# Patient Record
(demographics unavailable — no encounter records)

---

## 2024-10-07 NOTE — REVIEW OF SYSTEMS
[Fatigue] : fatigue [Cough] : cough [Constipation] : constipation [Diarrhea: Grade 0] : Diarrhea: Grade 0 [Negative] : Allergic/Immunologic

## 2024-10-07 NOTE — HISTORY OF PRESENT ILLNESS
[Disease: _____________________] : Disease: [unfilled] [M: ___] : M[unfilled] [AJCC Stage: ____] : AJCC Stage: [unfilled] [de-identified] : 83 years old female with PMHX of Hypertension comes for evaluation of newly diagnosed Colon Cancer. As per patient, she was unable to move her bowels for 7 days. She admits to increased fatigue, weakness, abdominal pain, abdominal distention, nausea, vomiting and a weight loss of ~15 pounds. She was taken to [facility], and a CT-Scan of abdomen/pelvis was done that revealed a large bowel obstruction. A sigmoidoscopy was performed which found an obstructing sigmoid tumor, and a stent was placed.  A biopsy was done that was positive for Adenocarcinoma. She denies night sweats, fever, melena or bloody stools.  2024, CT-Scan of Abdomen/Pelvis Large bowel obstruction with secondary small bowel dilatation due to apparent sigmoid carcinoma. Pulmonary masses consistent with metastases. Suggest full chest CT Indeterminant left adrenal nodule. Indeterminant right renal hypodensity.   2024, Flexible Sigmoidoscopy: Malignant completely obstructing tumor in the sigmoid colon s/p biopsy and stent placement (25 x 90 mm). Diverticulosis in the sigmoid colon. Pathology report from that was as follows: Colon, sigmoid mass, biopsy- Adenocarcinoma, superficial fragment. Immunohistochemistry Testing (IHC) for Mismatch Repair Protein: MLH1: Intact nuclear expression. MSH2: Intact nuclear expression. MSH6: Intact nuclear expression. PMS2: Intact nuclear expression. Interpretation: No loss of nuclear expression of MMR proteins (MHL1, MSH2, MSH6, and PMS2). These results show low probability of microsatellite instability-high (MSI-H). PD-L1: Tumor Proportion Score (TPS)=0%.   2024, Fine Needle Aspiration of Lun. LUNG, MAINSTEM, RIGHT, ENDOBRONCHIAL BIOPSY: POSITIVE FOR MALIGNANT CELLS. Adenocarcinoma, CDX2, Ck20 pos, CK7-focal TTF-1 neg consistent with colon primary 2. LUNG, LEFT LOWER LOBE, BRONCHOALVEOLAR LAVAGE: SUSPICIOUS FOR MALIGNANCY. Suspicious for adenocarcinoma  Patient is . She was living alone in [facility] but now, she lives with one of daughter in [place], [place]. She has 3 well adult children. She never smoked. She drinks alcohol socially. The patient is a retired [employer] Worker. The patient has a positive family history of cancer. Her father had Pancreatic Cancer in his 80's. Her paternal grandmother had Breast Cancer in her 90's. Her mother had a unknown type of cancer in her 50's.    [de-identified] : left sided  colon ca, Adenocarcinoma MMR-P, PDL-1- TPS-0 [de-identified] : Lung metastases biopsy pos for metastases, CK20 and CDX-2 pos, TTF-1 neg CK-7 focal consistent with colon primary. [de-identified] : Since the last visit the pt remains stable but has decreased appetite and losing weight.  She states she has difficulty swallowing.  Patient's mouth sores are decreased however.

## 2024-10-07 NOTE — ASSESSMENT
[FreeTextEntry1] : The patient continues on chemotherapy with FOLFOX and Vectibix and tolerates treatment well but has decreased appetite and decreased caloric intake.  Patient will undergo palliative care consultation and evaluation for medical marijuana to stimulate appetite.  The patient will also undergo repeat MRI of the abdomen.  Patient has mild sores but decreasing in severity.  Patient has dry cough and x-ray has been ordered.  He will return to the office in 2 weeks or sooner as needed. [Palliative] : Goals of care discussed with patient: Palliative [Palliative Care Plan] : not applicable at this time

## 2024-10-14 NOTE — PHYSICAL EXAM
[General Appearance - Alert] : alert [General Appearance - In No Acute Distress] : in no acute distress [General Appearance - Well Nourished] : well nourished [General Appearance - Well Developed] : well developed [Extraocular Movements] : extraocular movements were intact [Hearing Threshold Finger Rub Not [place]] : hearing was normal [] : no respiratory distress [Respiration, Rhythm And Depth] : normal respiratory rhythm and effort [Exaggerated Use Of Accessory Muscles For Inspiration] : no accessory muscle use [Auscultation Breath Sounds / Voice Sounds] : lungs were clear to auscultation bilaterally [Heart Rate And Rhythm] : heart rate was normal and rhythm regular [Heart Sounds] : normal S1 and S2 [Murmurs] : no murmurs [Bowel Sounds] : normal bowel sounds [Abdomen Soft] : soft [Abdomen Tenderness] : non-tender [Involuntary Movements] : no involuntary movements were seen [No Focal Deficits] : no focal deficits [Oriented To Time, Place, And Person] : oriented to person, place, and time [Impaired Insight] : insight and judgment were intact [Affect] : the affect was normal [Mood] : the mood was normal

## 2024-10-14 NOTE — DATA REVIEWED
[FreeTextEntry1] : CT C/A/P  (08/20/2024):   COMPARISON: Chest CT from 5/16/2024. CT scan of abdomen and pelvis from 5/12/2024. CT scans of chest, abdomen and pelvis from 3/11/2024.  FINDINGS: CHEST: LUNGS AND LARGE AIRWAYS: Decrease in size of lung nodules and masses, consistent with improvement in pulmonary metastatic disease. For instance, mass in anterior segment of right upper lobe has decreased from 69 x 47 mm to 35 x 30 mm (4/97). Nodule in right apex has decreased from 19 x 14 mm to 14 x 8 mm (4/65). PLEURA: No pleural effusion. VESSELS: Within normal limits. HEART: Cardiomegaly. No pericardial effusion. MEDIASTINUM AND NHUNG: Improvement in thoracic lymphadenopathy. For instance, station 7 lymph node has decreased from 2.8 cm to 1.5 cm, Station 11RS lymph node has decreased from 2.7 cm to 2.1 cm, and station 5 lymph node has decreased from 2.1 cm to 0.9 cm. The lymph nodes are partially calcified. CHEST WALL AND LOWER NECK: Right chest wall [id number] with tip in the right atrium. Small breast calcifications again present bilaterally.  ABDOMEN AND PELVIS: LIVER: Possible new subtle 0.8 cm low-density lesion segment 8 (2/58). BILE DUCTS: Normal caliber. GALLBLADDER: Within normal limits. SPLEEN: Within normal limits. PANCREAS: Within normal limits. ADRENALS: Within normal limits. KIDNEYS/URETERS: 1.3 cm cyst right kidney. Left kidney within normal limits.  BLADDER: Within normal limits. REPRODUCTIVE ORGANS: Calcified uterine fibroids.  BOWEL: Small hiatal hernia. No bowel obstruction. A stent again traverses the mass of the distal descending colon. The mass has decreased in size from 7.9 x 4.9 cm to 5.8 x 3.9 cm (2/107). The mass is again partially calcified and extends medially into the mesentery. Multiple diverticula in the sigmoid colon and cecum. Normal appendix visualized. PERITONEUM/RETROPERITONEUM: Within normal limits. VESSELS: Atherosclerotic changes. LYMPH NODES: No lymphadenopathy. ABDOMINAL WALL: Within normal limits. BONES: Degenerative changes.  IMPRESSION: 1. Since 5/16/2024, there has been improvement in pulmonary metastatic disease.  2. Improvement in thoracic lymphadenopathy.  3. Decrease in size of colon mass.  4. There is a possible new small liver mass. Recommend MRI of the abdomen with IV contrast material for further evaluation.

## 2024-10-14 NOTE — ASSESSMENT
[FreeTextEntry1] : 84yoF with:  # Colon cancer  - On chemo with FOLFOX and Vectibix, tolerating treatment  # Decrease in appetite  - Plan to retry magic mouthwash - Medical cannabis certification completed today. Provided cannabis education, overview of state program, and discussed adverse effects in detail. Counseled that vaporized cannabis is not the preferred route of administration due to the fact that both short-term and long-term risks associated with vaporizing oils are not yet fully understood. Recommend starting with 1:1 THC:CBD formulation at low dose of THC (~2mg/dose).   # Constipation - Start Senna at bedtime  # Advanced care planning - Primary HCP is daughter - Марина Ricks [phone], Alternate is son Rob Ricks [phone] (lives in [place]). Plans to bring copy of HCP paperwork next visit  # Encounter for palliative care  - Emotional support provided   Follow up in 2 weeks, call sooner with questions or issues.

## 2024-10-14 NOTE — HISTORY OF PRESENT ILLNESS
[FreeTextEntry1] : 84yoF with colon cancer presents for initial palliative care visit, referred by oncology. PMH significant for HTN.  Onc Hx: Came in for evaluation of newly diagnosed Colon Cancer. As per patient, she was unable to move her bowels for 7 days. She admits to increased fatigue, weakness, abdominal pain, abdominal distention, nausea, vomiting and a weight loss of ~15 pounds. She was taken to [facility], and a CT-Scan of abdomen/pelvis was done that revealed a large bowel obstruction. A sigmoidoscopy was performed which found an obstructing sigmoid tumor, and a stent was placed. A biopsy was done that was positive for Adenocarcinoma. She denies night sweats, fever, melena or bloody stools.  2024, CT-Scan of Abdomen/Pelvis Large bowel obstruction with secondary small bowel dilatation due to apparent sigmoid carcinoma. Pulmonary masses consistent with metastases. Suggest full chest CT Indeterminant left adrenal nodule. Indeterminant right renal hypodensity.  2024, Flexible Sigmoidoscopy: Malignant completely obstructing tumor in the sigmoid colon s/p biopsy and stent placement (25 x 90 mm). Diverticulosis in the sigmoid colon. Pathology report from that was as follows: Colon, sigmoid mass, biopsy- Adenocarcinoma, superficial fragment. Immunohistochemistry Testing (IHC) for Mismatch Repair Protein: MLH1: Intact nuclear expression. MSH2: Intact nuclear expression. MSH6: Intact nuclear expression. PMS2: Intact nuclear expression. Interpretation: No loss of nuclear expression of MMR proteins (MHL1, MSH2, MSH6, and PMS2). These results show low probability of microsatellite instability-high (MSI-H). PD-L1: Tumor Proportion Score (TPS)=0%.  2024, Fine Needle Aspiration of Lun. LUNG, MAINSTEM, RIGHT, ENDOBRONCHIAL BIOPSY: POSITIVE FOR MALIGNANT CELLS. Adenocarcinoma, CDX2, Ck20 pos, CK7-focal TTF-1 neg consistent with colon primary 2. LUNG, LEFT LOWER LOBE, BRONCHOALVEOLAR LAVAGE: SUSPICIOUS FOR MALIGNANCY. Suspicious for adenocarcinoma   Disease: Colon Cancer   Pathology: left sided colon ca, Adenocarcinoma MMR-P, PDL-1- TPS-0   TNM stage: MI AJCC Stage: IV   Lung metastases biopsy pos for metastases, CK20 and CDX-2 pos, TTF-1 neg CK-7 focal consistent with colon primary.   Interval History: Since the last visit the pt remains stable but has decreased appetite and losing weight. She states she has difficulty swallowing. Patient's mouth sores are decreased however. ------------------------------------------------------------------------------------------------------ Pt was referred to supportive oncology for symptom management. She is accompanied by her daughter.    Pain: Denies Constipation/Diarrhea: Taking Colace and miralax daily. Having BM every 2 days. Was using senna tea bags but stopped due to discomfort.  Nausea/Vomiting: Nausea with eating. Taking Zofran 8mg q8h  Appetite/Weight changes: Losing weight. Not interested in food. Daughter is scheduling follow up with dentist to correct dentures. Admits to some sores in mouth so prefers eating soft foods. Sleep: No difficulty sleeping   Social: Patient is . She was living alone in [facility], but now, she lives with one of daughter in [place], [place]. She has 3 well adult children. She never smoked. She drinks alcohol socially. The patient is a retired [employer] Worker. The patient has a positive family history of cancer. Her father had Pancreatic Cancer in his 80's. Her paternal grandmother had Breast Cancer in her 90's. Her mother had a unknown type of cancer in her 50's.   Advance Directives / Decision Makers: Primary HCP is daughter - Марина Ricks [phone], Alternate is son Rob Ricks [phone] (lives in [place]). Plans to bring copy of HCP paperwork next visit.   ISTOP #: [id number]

## 2024-10-21 NOTE — ASSESSMENT
[FreeTextEntry1] : 84yoF with:  # Colon cancer  - On chemo with FOLFOX and Vectibix, tolerating treatment  # Decrease in appetite  - Relief with magic mouthwash, encouraged her to use more frequently with meals - Recommended pt to dec cannabis dose from 5mg to 2.5mg, pt agreeable.  # Constipation - Improved - Continue Senna at bedtime  # Advanced care planning - Primary HCP is daughter - Марина Ricks [phone], Alternate is son Rob Ricks [phone] (lives in [place])  # Encounter for palliative care  - Emotional support provided   Follow up in 2 weeks, call sooner with questions or issues.

## 2024-10-21 NOTE — PHYSICAL EXAM
[Thin] : thin [Restricted in physically strenuous activity but ambulatory and able to carry out work of a light or sedentary nature] : Status 1- Restricted in physically strenuous activity but ambulatory and able to carry out work of a light or sedentary nature, e.g., light house work, office work [Normal] : well developed, well nourished, in no acute distress

## 2024-10-21 NOTE — PHYSICAL EXAM
[General Appearance - Alert] : alert [General Appearance - In No Acute Distress] : in no acute distress [General Appearance - Well Nourished] : well nourished [General Appearance - Well Developed] : well developed [Extraocular Movements] : extraocular movements were intact [Hearing Threshold Finger Rub Not [place]] : hearing was normal [] : no respiratory distress [Respiration, Rhythm And Depth] : normal respiratory rhythm and effort [Exaggerated Use Of Accessory Muscles For Inspiration] : no accessory muscle use [Auscultation Breath Sounds / Voice Sounds] : lungs were clear to auscultation bilaterally [Heart Rate And Rhythm] : heart rate was normal and rhythm regular [Heart Sounds] : normal S1 and S2 [Murmurs] : no murmurs [Bowel Sounds] : normal bowel sounds [Abdomen Soft] : soft [Abdomen Tenderness] : non-tender [Involuntary Movements] : no involuntary movements were seen [No Focal Deficits] : no focal deficits [Oriented To Time, Place, And Person] : oriented to person, place, and time [Impaired Insight] : insight and judgment were intact [Affect] : the affect was normal [Mood] : the mood was normal

## 2024-10-21 NOTE — DATA REVIEWED
[FreeTextEntry1] : MR ABDOMEN WAW IC  (10/17/2024):   COMPARISON: CT abdomen pelvis 8/20/2024  FINDINGS: LOWER CHEST: Nodular masslike opacities in the right middle and lower lobe better characterized on recent prior CT, compatible with metastatic disease.  LIVER: Normal size and morphology. No steatosis. No suspicious focal liver lesion. Abnormality in the right hepatic lobe seen on CT 8/20/2024 BILE DUCTS: Normal caliber. GALLBLADDER: Cholelithiasis. SPLEEN: Within normal limits. PANCREAS: Within normal limits. ADRENALS: Within normal limits. KIDNEYS/URETERS: No hydronephrosis. There is a 1.3 cm cyst in the right kidney exhibiting mild T1 hyperintensity and no enhancement, likely representing proteinaceous or hemorrhagic component.  VISUALIZED PORTIONS: BOWEL: Partially visualized distal descending colon stent and irregular colon mass. No bowel obstruction. PERITONEUM: No ascites. VESSELS: Within normal limits. RETROPERITONEUM/LYMPH NODES: No lymphadenopathy. ABDOMINAL WALL: Within normal limits. BONES: Within normal limits.  IMPRESSION: No suspicious liver lesions. ----- CT C/A/P  (08/20/2024):   COMPARISON: Chest CT from 5/16/2024. CT scan of abdomen and pelvis from 5/12/2024. CT scans of chest, abdomen and pelvis from 3/11/2024.  FINDINGS: CHEST: LUNGS AND LARGE AIRWAYS: Decrease in size of lung nodules and masses, consistent with improvement in pulmonary metastatic disease. For instance, mass in anterior segment of right upper lobe has decreased from 69 x 47 mm to 35 x 30 mm (4/97). Nodule in right apex has decreased from 19 x 14 mm to 14 x 8 mm (4/65). PLEURA: No pleural effusion. VESSELS: Within normal limits. HEART: Cardiomegaly. No pericardial effusion. MEDIASTINUM AND NHUNG: Improvement in thoracic lymphadenopathy. For instance, station 7 lymph node has decreased from 2.8 cm to 1.5 cm, Station 11RS lymph node has decreased from 2.7 cm to 2.1 cm, and station 5 lymph node has decreased from 2.1 cm to 0.9 cm. The lymph nodes are partially calcified. CHEST WALL AND LOWER NECK: Right chest wall [id number]-Hsik with tip in the right atrium. Small breast calcifications again present bilaterally.  ABDOMEN AND PELVIS: LIVER: Possible new subtle 0.8 cm low-density lesion segment 8 (2/58). BILE DUCTS: Normal caliber. GALLBLADDER: Within normal limits. SPLEEN: Within normal limits. PANCREAS: Within normal limits. ADRENALS: Within normal limits. KIDNEYS/URETERS: 1.3 cm cyst right kidney. Left kidney within normal limits.  BLADDER: Within normal limits. REPRODUCTIVE ORGANS: Calcified uterine fibroids.  BOWEL: Small hiatal hernia. No bowel obstruction. A stent again traverses the mass of the distal descending colon. The mass has decreased in size from 7.9 x 4.9 cm to 5.8 x 3.9 cm (2/107). The mass is again partially calcified and extends medially into the mesentery. Multiple diverticula in the sigmoid colon and cecum. Normal appendix visualized. PERITONEUM/RETROPERITONEUM: Within normal limits. VESSELS: Atherosclerotic changes. LYMPH NODES: No lymphadenopathy. ABDOMINAL WALL: Within normal limits. BONES: Degenerative changes.  IMPRESSION: 1. Since 5/16/2024, there has been improvement in pulmonary metastatic disease.  2. Improvement in thoracic lymphadenopathy.  3. Decrease in size of colon mass.  4. There is a possible new small liver mass. Recommend MRI of the abdomen with IV contrast material for further evaluation.

## 2024-10-21 NOTE — HISTORY OF PRESENT ILLNESS
[FreeTextEntry1] : 84yoF with colon cancer presents for follow up palliative care visit, referred by oncology. PMH significant for HTN.  Onc Hx: Came in for evaluation of newly diagnosed Colon Cancer. As per patient, she was unable to move her bowels for 7 days. She admits to increased fatigue, weakness, abdominal pain, abdominal distention, nausea, vomiting and a weight loss of ~15 pounds. She was taken to [facility], and a CT-Scan of abdomen/pelvis was done that revealed a large bowel obstruction. A sigmoidoscopy was performed which found an obstructing sigmoid tumor, and a stent was placed. A biopsy was done that was positive for Adenocarcinoma. She denies night sweats, fever, melena or bloody stools.  2024, CT-Scan of Abdomen/Pelvis Large bowel obstruction with secondary small bowel dilatation due to apparent sigmoid carcinoma. Pulmonary masses consistent with metastases. Suggest full chest CT Indeterminant left adrenal nodule. Indeterminant right renal hypodensity.  2024, Flexible Sigmoidoscopy: Malignant completely obstructing tumor in the sigmoid colon s/p biopsy and stent placement (25 x 90 mm). Diverticulosis in the sigmoid colon. Pathology report from that was as follows: Colon, sigmoid mass, biopsy- Adenocarcinoma, superficial fragment. Immunohistochemistry Testing (IHC) for Mismatch Repair Protein: MLH1: Intact nuclear expression. MSH2: Intact nuclear expression. MSH6: Intact nuclear expression. PMS2: Intact nuclear expression. Interpretation: No loss of nuclear expression of MMR proteins (MHL1, MSH2, MSH6, and PMS2). These results show low probability of microsatellite instability-high (MSI-H). PD-L1: Tumor Proportion Score (TPS)=0%.  2024, Fine Needle Aspiration of Lun. LUNG, MAINSTEM, RIGHT, ENDOBRONCHIAL BIOPSY: POSITIVE FOR MALIGNANT CELLS. Adenocarcinoma, CDX2, Ck20 pos, CK7-focal TTF-1 neg consistent with colon primary 2. LUNG, LEFT LOWER LOBE, BRONCHOALVEOLAR LAVAGE: SUSPICIOUS FOR MALIGNANCY. Suspicious for adenocarcinoma   Disease: Colon Cancer   Pathology: left sided colon ca, Adenocarcinoma MMR-P, PDL-1- TPS-0   TNM stage: MI AJCC Stage: IV   Lung metastases biopsy pos for metastases, CK20 and CDX-2 pos, TTF-1 neg CK-7 focal consistent with colon primary.   Interval History: Since the last visit the pt remains stable but has decreased appetite and losing weight. She states she has difficulty swallowing. Patient's mouth sores are decreased however. ------------------------------------------------------------------------------------------------------ Pt was referred to supportive oncology for symptom management. She is accompanied by her daughter. At initial evaluation, she denies pain. She is taking Colace and miralax daily. Having BM every 2 days but strains at times. Was using senna tea bags but stopped due to discomfort. Admits to nausea while eating, taking Zofran 8mg q8h. She also admits to weight loss. She is not interested in food. Daughter is scheduling follow up with dentist to correct dentures. Admits to some sores in mouth so prefers eating soft foods. Denies difficulty sleeping.   Social: Patient is . She was living alone in [facility], but now, she lives with one of daughter in [place], [place]. She has 3 well adult children. She never smoked. She drinks alcohol socially. The patient is a retired [employer] Worker. The patient has a positive family history of cancer. Her father had Pancreatic Cancer in his 80's. Her paternal grandmother had Breast Cancer in her 90's. Her mother had a unknown type of cancer in her 50's.   Advance Directives / Decision Makers: Primary HCP is daughter - Марина Ricks [phone], Alternate is son Rob Ricks [phone] (lives in [place]). Plans to bring copy of HCP paperwork next visit.   10/21/24 Interval History: Started cannabis on Friday, 5mg chews 1:1. She felt relaxed, didn't affect appetite much. Using magic mouthwash with relief. She remotes improvement in constipation with adding Senna to her regimen. She has no other concerns today.   ISTOP #: [id number]

## 2024-10-22 NOTE — HISTORY OF PRESENT ILLNESS
[Disease: _____________________] : Disease: [unfilled] [M: ___] : M[unfilled] [AJCC Stage: ____] : AJCC Stage: [unfilled] [de-identified] : 83 years old female with PMHX of Hypertension comes for evaluation of newly diagnosed Colon Cancer. As per patient, she was unable to move her bowels for 7 days. She admits to increased fatigue, weakness, abdominal pain, abdominal distention, nausea, vomiting and a weight loss of ~15 pounds. She was taken to [facility], and a CT-Scan of abdomen/pelvis was done that revealed a large bowel obstruction. A sigmoidoscopy was performed which found an obstructing sigmoid tumor, and a stent was placed.  A biopsy was done that was positive for Adenocarcinoma. She denies night sweats, fever, melena or bloody stools.  2024, CT-Scan of Abdomen/Pelvis Large bowel obstruction with secondary small bowel dilatation due to apparent sigmoid carcinoma. Pulmonary masses consistent with metastases. Suggest full chest CT Indeterminant left adrenal nodule. Indeterminant right renal hypodensity.   2024, Flexible Sigmoidoscopy: Malignant completely obstructing tumor in the sigmoid colon s/p biopsy and stent placement (25 x 90 mm). Diverticulosis in the sigmoid colon. Pathology report from that was as follows: Colon, sigmoid mass, biopsy- Adenocarcinoma, superficial fragment. Immunohistochemistry Testing (IHC) for Mismatch Repair Protein: MLH1: Intact nuclear expression. MSH2: Intact nuclear expression. MSH6: Intact nuclear expression. PMS2: Intact nuclear expression. Interpretation: No loss of nuclear expression of MMR proteins (MHL1, MSH2, MSH6, and PMS2). These results show low probability of microsatellite instability-high (MSI-H). PD-L1: Tumor Proportion Score (TPS)=0%.   2024, Fine Needle Aspiration of Lun. LUNG, MAINSTEM, RIGHT, ENDOBRONCHIAL BIOPSY: POSITIVE FOR MALIGNANT CELLS. Adenocarcinoma, CDX2, Ck20 pos, CK7-focal TTF-1 neg consistent with colon primary 2. LUNG, LEFT LOWER LOBE, BRONCHOALVEOLAR LAVAGE: SUSPICIOUS FOR MALIGNANCY. Suspicious for adenocarcinoma  Patient is . She was living alone in [facility] but now, she lives with one of daughter in [place], [place]. She has 3 well adult children. She never smoked. She drinks alcohol socially. The patient is a retired [employer] Worker. The patient has a positive family history of cancer. Her father had Pancreatic Cancer in his 80's. Her paternal grandmother had Breast Cancer in her 90's. Her mother had a unknown type of cancer in her 50's.    [de-identified] : left sided  colon ca, Adenocarcinoma MMR-P, PDL-1- TPS-0 [de-identified] : Lung metastases biopsy pos for metastases, CK20 and CDX-2 pos, TTF-1 neg CK-7 focal consistent with colon primary. [de-identified] : S/p C10 FOLFOX/Vectibex. Since the last visit, the patient has started taking Cannabis gummies. She has started to eat a little better. Her cough has become slightly better, but she has not done the CXR, yet. She has no fevers.

## 2024-10-22 NOTE — HISTORY OF PRESENT ILLNESS
[Disease: _____________________] : Disease: [unfilled] [M: ___] : M[unfilled] [AJCC Stage: ____] : AJCC Stage: [unfilled] [de-identified] : 83 years old female with PMHX of Hypertension comes for evaluation of newly diagnosed Colon Cancer. As per patient, she was unable to move her bowels for 7 days. She admits to increased fatigue, weakness, abdominal pain, abdominal distention, nausea, vomiting and a weight loss of ~15 pounds. She was taken to [facility], and a CT-Scan of abdomen/pelvis was done that revealed a large bowel obstruction. A sigmoidoscopy was performed which found an obstructing sigmoid tumor, and a stent was placed.  A biopsy was done that was positive for Adenocarcinoma. She denies night sweats, fever, melena or bloody stools.  2024, CT-Scan of Abdomen/Pelvis Large bowel obstruction with secondary small bowel dilatation due to apparent sigmoid carcinoma. Pulmonary masses consistent with metastases. Suggest full chest CT Indeterminant left adrenal nodule. Indeterminant right renal hypodensity.   2024, Flexible Sigmoidoscopy: Malignant completely obstructing tumor in the sigmoid colon s/p biopsy and stent placement (25 x 90 mm). Diverticulosis in the sigmoid colon. Pathology report from that was as follows: Colon, sigmoid mass, biopsy- Adenocarcinoma, superficial fragment. Immunohistochemistry Testing (IHC) for Mismatch Repair Protein: MLH1: Intact nuclear expression. MSH2: Intact nuclear expression. MSH6: Intact nuclear expression. PMS2: Intact nuclear expression. Interpretation: No loss of nuclear expression of MMR proteins (MHL1, MSH2, MSH6, and PMS2). These results show low probability of microsatellite instability-high (MSI-H). PD-L1: Tumor Proportion Score (TPS)=0%.   2024, Fine Needle Aspiration of Lun. LUNG, MAINSTEM, RIGHT, ENDOBRONCHIAL BIOPSY: POSITIVE FOR MALIGNANT CELLS. Adenocarcinoma, CDX2, Ck20 pos, CK7-focal TTF-1 neg consistent with colon primary 2. LUNG, LEFT LOWER LOBE, BRONCHOALVEOLAR LAVAGE: SUSPICIOUS FOR MALIGNANCY. Suspicious for adenocarcinoma  Patient is . She was living alone in [facility] but now, she lives with one of daughter in [place], [place]. She has 3 well adult children. She never smoked. She drinks alcohol socially. The patient is a retired [employer] Worker. The patient has a positive family history of cancer. Her father had Pancreatic Cancer in his 80's. Her paternal grandmother had Breast Cancer in her 90's. Her mother had a unknown type of cancer in her 50's.    [de-identified] : left sided  colon ca, Adenocarcinoma MMR-P, PDL-1- TPS-0 [de-identified] : Lung metastases biopsy pos for metastases, CK20 and CDX-2 pos, TTF-1 neg CK-7 focal consistent with colon primary. [de-identified] : S/p C10 FOLFOX/Vectibex. Since the last visit, the patient has started taking Cannabis gummies. She has started to eat a little better. Her cough has become slightly better, but she has not done the CXR, yet. She has no fevers.

## 2024-10-22 NOTE — REVIEW OF SYSTEMS
[Fatigue] : fatigue [Constipation] : constipation [Diarrhea: Grade 0] : Diarrhea: Grade 0 [Cough] : cough [Negative] : Neurological [Fever] : no fever [Chills] : no chills [Night Sweats] : no night sweats [Recent Change In Weight] : ~T no recent weight change [Abdominal Pain] : no abdominal pain [FreeTextEntry2] : +decreased appetite, [de-identified] : left hand tingling

## 2024-10-22 NOTE — ASSESSMENT
[Palliative] : Goals of care discussed with patient: Palliative [Palliative Care Plan] : not applicable at this time [FreeTextEntry1] : The patient continues on chemotherapy with FOLFOX and Vectibix and tolerates treatment fine. She has an improved appetite with Cannabis gummies. She is advised to continue as directed. The patient had repeat MRI of the abdomen on 10/17/2024 which showed; no new metastatic disease, but in the lower chest there was a nodular masslike opacities in the right middle and lower lobe better characterized on recent prior CT, compatible with metastatic disease. Dr. Aguilar will review the results of MRI to determine if any changes are needed to her present treatment.. She will return to the office in 2 weeks or sooner as needed.

## 2024-10-22 NOTE — REVIEW OF SYSTEMS
[Fatigue] : fatigue [Constipation] : constipation [Diarrhea: Grade 0] : Diarrhea: Grade 0 [Cough] : cough [Negative] : Neurological [Fever] : no fever [Chills] : no chills [Night Sweats] : no night sweats [Recent Change In Weight] : ~T no recent weight change [Abdominal Pain] : no abdominal pain [FreeTextEntry2] : +decreased appetite, [de-identified] : left hand tingling

## 2024-10-23 NOTE — PHYSICAL EXAM
[Normal] : no acute distress, well nourished, well developed and well-appearing [No Respiratory Distress] : no respiratory distress  [No Accessory Muscle Use] : no accessory muscle use [Clear to Auscultation] : lungs were clear to auscultation bilaterally [Normal Rate] : normal rate  [Regular Rhythm] : with a regular rhythm [Normal S1, S2] : normal S1 and S2 [Pedal Pulses Present] : the pedal pulses are present [No Edema] : there was no peripheral edema [Normal Affect] : the affect was normal [Normal Mood] : the mood was normal

## 2024-10-28 NOTE — HISTORY OF PRESENT ILLNESS
[Family Member] : family member [FreeTextEntry8] : Patient is an 85 y/o F with PMHx of HTN, prediabetes, HLD, newly diagnosed sigmoid colon (s/p sigmoidoscopy and stent placement) following Heme/onc, who was sent by Heme/onc to f/u on elevated Cr levels.   Patient currently on chemotherapy for colon cancer w/ Mets to lungs. patient on FOLFOX and Vectibix. Patient's daughter also reports that the patient has no appetite. She is also not drinking enough water. Patient denies urinary issues and still urinating normal.

## 2024-10-28 NOTE — ASSESSMENT
[FreeTextEntry1] : The plan of care was discussed with the patient and her daughter in full detail. They verbalized understanding and in agreement with the plan of care.  RTC in 1 month or early if needed.

## 2024-10-28 NOTE — HISTORY OF PRESENT ILLNESS
[Family Member] : family member [FreeTextEntry8] : Patient is an 83 y/o F with PMHx of HTN, prediabetes, HLD, newly diagnosed sigmoid colon (s/p sigmoidoscopy and stent placement) following Heme/onc, who was sent by Heme/onc to f/u on elevated Cr levels.   Patient currently on chemotherapy for colon cancer w/ Mets to lungs. patient on FOLFOX and Vectibix. Patient's daughter also reports that the patient has no appetite. She is also not drinking enough water. Patient denies urinary issues and still urinating normal.

## 2024-11-04 NOTE — ASSESSMENT
[FreeTextEntry1] : 84yoF with:  # Colon cancer  - On chemo with FOLFOX and Vectibix, tolerating treatment  # Decrease in appetite  - Relief with magic mouthwash, encouraged her to use more frequently with meals - Taking medical cannabis gummies 2.5mg before meals, but does not notice difference - Take 1 full gummy 5mg to see if improvement - Start Mirtazapine 7.5mg qhs, goal to increase to 15mg qhs (eGFR 10/17/24 is 40)  # Constipation - Improved - Continue Senna at bedtime  # Debility - Does not want PT at this time, will consider  # Advanced care planning - Primary HCP is daughter - Марина Ricks [phone], Alternate is son Rob Ricks [phone] (lives in [place])  # Encounter for Palliative Care - Explained the role of palliative care in enhancing quality of life in the setting of serious illness. Emotional support provided.   Follow up in 2 weeks, call sooner with questions or issues.

## 2024-11-04 NOTE — DATA REVIEWED
[FreeTextEntry1] : MR ABDOMEN WAW IC  (10/17/2024):   COMPARISON: CT abdomen pelvis 8/20/2024  FINDINGS: LOWER CHEST: Nodular masslike opacities in the right middle and lower lobe better characterized on recent prior CT, compatible with metastatic disease.  LIVER: Normal size and morphology. No steatosis. No suspicious focal liver lesion. Abnormality in the right hepatic lobe seen on CT 8/20/2024 BILE DUCTS: Normal caliber. GALLBLADDER: Cholelithiasis. SPLEEN: Within normal limits. PANCREAS: Within normal limits. ADRENALS: Within normal limits. KIDNEYS/URETERS: No hydronephrosis. There is a 1.3 cm cyst in the right kidney exhibiting mild T1 hyperintensity and no enhancement, likely representing proteinaceous or hemorrhagic component.  VISUALIZED PORTIONS: BOWEL: Partially visualized distal descending colon stent and irregular colon mass. No bowel obstruction. PERITONEUM: No ascites. VESSELS: Within normal limits. RETROPERITONEUM/LYMPH NODES: No lymphadenopathy. ABDOMINAL WALL: Within normal limits. BONES: Within normal limits.  IMPRESSION: No suspicious liver lesions. ----- CT C/A/P  (08/20/2024):   COMPARISON: Chest CT from 5/16/2024. CT scan of abdomen and pelvis from 5/12/2024. CT scans of chest, abdomen and pelvis from 3/11/2024.  FINDINGS: CHEST: LUNGS AND LARGE AIRWAYS: Decrease in size of lung nodules and masses, consistent with improvement in pulmonary metastatic disease. For instance, mass in anterior segment of right upper lobe has decreased from 69 x 47 mm to 35 x 30 mm (4/97). Nodule in right apex has decreased from 19 x 14 mm to 14 x 8 mm (4/65). PLEURA: No pleural effusion. VESSELS: Within normal limits. HEART: Cardiomegaly. No pericardial effusion. MEDIASTINUM AND NHUNG: Improvement in thoracic lymphadenopathy. For instance, station 7 lymph node has decreased from 2.8 cm to 1.5 cm, Station 11RS lymph node has decreased from 2.7 cm to 2.1 cm, and station 5 lymph node has decreased from 2.1 cm to 0.9 cm. The lymph nodes are partially calcified. CHEST WALL AND LOWER NECK: Right chest wall [id number] with tip in the right atrium. Small breast calcifications again present bilaterally.  ABDOMEN AND PELVIS: LIVER: Possible new subtle 0.8 cm low-density lesion segment 8 (2/58). BILE DUCTS: Normal caliber. GALLBLADDER: Within normal limits. SPLEEN: Within normal limits. PANCREAS: Within normal limits. ADRENALS: Within normal limits. KIDNEYS/URETERS: 1.3 cm cyst right kidney. Left kidney within normal limits.  BLADDER: Within normal limits. REPRODUCTIVE ORGANS: Calcified uterine fibroids.  BOWEL: Small hiatal hernia. No bowel obstruction. A stent again traverses the mass of the distal descending colon. The mass has decreased in size from 7.9 x 4.9 cm to 5.8 x 3.9 cm (2/107). The mass is again partially calcified and extends medially into the mesentery. Multiple diverticula in the sigmoid colon and cecum. Normal appendix visualized. PERITONEUM/RETROPERITONEUM: Within normal limits. VESSELS: Atherosclerotic changes. LYMPH NODES: No lymphadenopathy. ABDOMINAL WALL: Within normal limits. BONES: Degenerative changes.  IMPRESSION: 1. Since 5/16/2024, there has been improvement in pulmonary metastatic disease.  2. Improvement in thoracic lymphadenopathy.  3. Decrease in size of colon mass.  4. There is a possible new small liver mass. Recommend MRI of the abdomen with IV contrast material for further evaluation.

## 2024-11-04 NOTE — HISTORY OF PRESENT ILLNESS
[Disease: _____________________] : Disease: [unfilled] [M: ___] : M[unfilled] [AJCC Stage: ____] : AJCC Stage: [unfilled] [de-identified] : 83 years old female with PMHX of Hypertension comes for evaluation of newly diagnosed Colon Cancer. As per patient, she was unable to move her bowels for 7 days. She admits to increased fatigue, weakness, abdominal pain, abdominal distention, nausea, vomiting and a weight loss of ~15 pounds. She was taken to [facility], and a CT-Scan of abdomen/pelvis was done that revealed a large bowel obstruction. A sigmoidoscopy was performed which found an obstructing sigmoid tumor, and a stent was placed.  A biopsy was done that was positive for Adenocarcinoma. She denies night sweats, fever, melena or bloody stools.  2024, CT-Scan of Abdomen/Pelvis Large bowel obstruction with secondary small bowel dilatation due to apparent sigmoid carcinoma. Pulmonary masses consistent with metastases. Suggest full chest CT Indeterminant left adrenal nodule. Indeterminant right renal hypodensity.   2024, Flexible Sigmoidoscopy: Malignant completely obstructing tumor in the sigmoid colon s/p biopsy and stent placement (25 x 90 mm). Diverticulosis in the sigmoid colon. Pathology report from that was as follows: Colon, sigmoid mass, biopsy- Adenocarcinoma, superficial fragment. Immunohistochemistry Testing (IHC) for Mismatch Repair Protein: MLH1: Intact nuclear expression. MSH2: Intact nuclear expression. MSH6: Intact nuclear expression. PMS2: Intact nuclear expression. Interpretation: No loss of nuclear expression of MMR proteins (MHL1, MSH2, MSH6, and PMS2). These results show low probability of microsatellite instability-high (MSI-H). PD-L1: Tumor Proportion Score (TPS)=0%.   2024, Fine Needle Aspiration of Lun. LUNG, MAINSTEM, RIGHT, ENDOBRONCHIAL BIOPSY: POSITIVE FOR MALIGNANT CELLS. Adenocarcinoma, CDX2, Ck20 pos, CK7-focal TTF-1 neg consistent with colon primary 2. LUNG, LEFT LOWER LOBE, BRONCHOALVEOLAR LAVAGE: SUSPICIOUS FOR MALIGNANCY. Suspicious for adenocarcinoma  Patient is . She was living alone in [facility] but now, she lives with one of daughter in [place], [place]. She has 3 well adult children. She never smoked. She drinks alcohol socially. 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 The patient has a positive family history of cancer. Her father had Pancreatic Cancer in his 80's. Her paternal grandmother had Breast Cancer in her 90's. Her mother had a unknown type of cancer in her 50's.    [de-identified] : left sided  colon ca, Adenocarcinoma MMR-P, PDL-1- TPS-0 [de-identified] : Lung metastases biopsy pos for metastases, CK20 and CDX-2 pos, TTF-1 neg CK-7 focal consistent with colon primary. [de-identified] : Pt has dry cough , no chest pain.  The patient continues on chemotherapy with 5-FU leucovorin and the oxaliplatin has been stopped.  She also continues on Vectibix.  She still has residual neuropathy.

## 2024-11-04 NOTE — ASSESSMENT
[FreeTextEntry1] : The patient overall continues to do well but does note fatigue and decreased appetite.  She continues on chemotherapy with 5-FU leucovorin and Vectibix as maintenance for her metastatic colon carcinoma left-sided with liver metastases.  The patient will continue to be monitored for side effects and toxicity.  She will continue to undergo follow-up CAT scans to assess for response or progression of disease.  She will return to the office in 2 weeks or sooner as needed. [Palliative] : Goals of care discussed with patient: Palliative [Palliative Care Plan] : not applicable at this time

## 2024-11-04 NOTE — HISTORY OF PRESENT ILLNESS
[FreeTextEntry1] : 84yoF with colon cancer presents for follow up palliative care visit, referred by oncology. PMH significant for HTN.  Onc Hx: Came in for evaluation of newly diagnosed Colon Cancer. As per patient, she was unable to move her bowels for 7 days. She admits to increased fatigue, weakness, abdominal pain, abdominal distention, nausea, vomiting and a weight loss of ~15 pounds. She was taken to [facility], and a CT-Scan of abdomen/pelvis was done that revealed a large bowel obstruction. A sigmoidoscopy was performed which found an obstructing sigmoid tumor, and a stent was placed. A biopsy was done that was positive for Adenocarcinoma. She denies night sweats, fever, melena or bloody stools.  2024, CT-Scan of Abdomen/Pelvis Large bowel obstruction with secondary small bowel dilatation due to apparent sigmoid carcinoma. Pulmonary masses consistent with metastases. Suggest full chest CT Indeterminant left adrenal nodule. Indeterminant right renal hypodensity.  2024, Flexible Sigmoidoscopy: Malignant completely obstructing tumor in the sigmoid colon s/p biopsy and stent placement (25 x 90 mm). Diverticulosis in the sigmoid colon. Pathology report from that was as follows: Colon, sigmoid mass, biopsy- Adenocarcinoma, superficial fragment. Immunohistochemistry Testing (IHC) for Mismatch Repair Protein: MLH1: Intact nuclear expression. MSH2: Intact nuclear expression. MSH6: Intact nuclear expression. PMS2: Intact nuclear expression. Interpretation: No loss of nuclear expression of MMR proteins (MHL1, MSH2, MSH6, and PMS2). These results show low probability of microsatellite instability-high (MSI-H). PD-L1: Tumor Proportion Score (TPS)=0%.  2024, Fine Needle Aspiration of Lun. LUNG, MAINSTEM, RIGHT, ENDOBRONCHIAL BIOPSY: POSITIVE FOR MALIGNANT CELLS. Adenocarcinoma, CDX2, Ck20 pos, CK7-focal TTF-1 neg consistent with colon primary 2. LUNG, LEFT LOWER LOBE, BRONCHOALVEOLAR LAVAGE: SUSPICIOUS FOR MALIGNANCY. Suspicious for adenocarcinoma   Disease: Colon Cancer   Pathology: left sided colon ca, Adenocarcinoma MMR-P, PDL-1- TPS-0   TNM stage: MI AJCC Stage: IV   Lung metastases biopsy pos for metastases, CK20 and CDX-2 pos, TTF-1 neg CK-7 focal consistent with colon primary.   Interval History: Since the last visit the pt remains stable but has decreased appetite and losing weight. She states she has difficulty swallowing. Patient's mouth sores are decreased however. ------------------------------------------------------------------------------------------------------ Pt was referred to supportive oncology for symptom management. She is accompanied by her daughter. At initial evaluation, she denies pain. She is taking Colace and miralax daily. Having BM every 2 days but strains at times. Was using senna tea bags but stopped due to discomfort. Admits to nausea while eating, taking Zofran 8mg q8h. She also admits to weight loss. She is not interested in food. Daughter is scheduling follow up with dentist to correct dentures. Admits to some sores in mouth so prefers eating soft foods. Denies difficulty sleeping.   Social: Patient is . She was living alone in [facility], but now, she lives with one of daughter in [place], [place]. She has 3 well adult children. She never smoked. She drinks alcohol socially. 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 The patient has a positive family history of cancer. Her father had Pancreatic Cancer in his 80's. Her paternal grandmother had Breast Cancer in her 90's. Her mother had a unknown type of cancer in her 50's.   Advance Directives / Decision Makers: Primary HCP is daughter - Марина Ricks [phone], Alternate is son Rob Ricks [phone] (lives in [place]). Plans to bring copy of HCP paperwork next visit.   10/21/24 Interval History: Started cannabis on Friday, 5mg chews 1:1. She felt relaxed, didn't affect appetite much. Using magic mouthwash with relief. She remotes improvement in constipation with adding Senna to her regimen. She has no other concerns today.   24 interval history: Using half of 5mg gummy before meals, but does not notice improvement in appetite. Denies feeling lethargic or any AE. Continues to use magic mouthwash with improvement. Denies constipation, nausea, vomiting. Pt's daughter said she's able to walk a bit on her own but still spends a lot of time laying down. She denies any pain.    ISTOP #: [id number]

## 2024-11-04 NOTE — HISTORY OF PRESENT ILLNESS
[Disease: _____________________] : Disease: [unfilled] [M: ___] : M[unfilled] [AJCC Stage: ____] : AJCC Stage: [unfilled] [de-identified] : 83 years old female with PMHX of Hypertension comes for evaluation of newly diagnosed Colon Cancer. As per patient, she was unable to move her bowels for 7 days. She admits to increased fatigue, weakness, abdominal pain, abdominal distention, nausea, vomiting and a weight loss of ~15 pounds. She was taken to [facility], and a CT-Scan of abdomen/pelvis was done that revealed a large bowel obstruction. A sigmoidoscopy was performed which found an obstructing sigmoid tumor, and a stent was placed.  A biopsy was done that was positive for Adenocarcinoma. She denies night sweats, fever, melena or bloody stools.  2024, CT-Scan of Abdomen/Pelvis Large bowel obstruction with secondary small bowel dilatation due to apparent sigmoid carcinoma. Pulmonary masses consistent with metastases. Suggest full chest CT Indeterminant left adrenal nodule. Indeterminant right renal hypodensity.   2024, Flexible Sigmoidoscopy: Malignant completely obstructing tumor in the sigmoid colon s/p biopsy and stent placement (25 x 90 mm). Diverticulosis in the sigmoid colon. Pathology report from that was as follows: Colon, sigmoid mass, biopsy- Adenocarcinoma, superficial fragment. Immunohistochemistry Testing (IHC) for Mismatch Repair Protein: MLH1: Intact nuclear expression. MSH2: Intact nuclear expression. MSH6: Intact nuclear expression. PMS2: Intact nuclear expression. Interpretation: No loss of nuclear expression of MMR proteins (MHL1, MSH2, MSH6, and PMS2). These results show low probability of microsatellite instability-high (MSI-H). PD-L1: Tumor Proportion Score (TPS)=0%.   2024, Fine Needle Aspiration of Lun. LUNG, MAINSTEM, RIGHT, ENDOBRONCHIAL BIOPSY: POSITIVE FOR MALIGNANT CELLS. Adenocarcinoma, CDX2, Ck20 pos, CK7-focal TTF-1 neg consistent with colon primary 2. LUNG, LEFT LOWER LOBE, BRONCHOALVEOLAR LAVAGE: SUSPICIOUS FOR MALIGNANCY. Suspicious for adenocarcinoma  Patient is . She was living alone in [facility] but now, she lives with one of daughter in [place], [place]. She has 3 well adult children. She never smoked. She drinks alcohol socially. 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 The patient has a positive family history of cancer. Her father had Pancreatic Cancer in his 80's. Her paternal grandmother had Breast Cancer in her 90's. Her mother had a unknown type of cancer in her 50's.    [de-identified] : left sided  colon ca, Adenocarcinoma MMR-P, PDL-1- TPS-0 [de-identified] : Lung metastases biopsy pos for metastases, CK20 and CDX-2 pos, TTF-1 neg CK-7 focal consistent with colon primary. [de-identified] : Pt has dry cough , no chest pain.  The patient continues on chemotherapy with 5-FU leucovorin and the oxaliplatin has been stopped.  She also continues on Vectibix.  She still has residual neuropathy.

## 2024-11-04 NOTE — PHYSICAL EXAM
[General Appearance - Alert] : alert [General Appearance - In No Acute Distress] : in no acute distress [General Appearance - Well Nourished] : well nourished [General Appearance - Well Developed] : well developed [Extraocular Movements] : extraocular movements were intact [Hearing Threshold Finger Rub Not [place]] : hearing was normal [] : no respiratory distress [Respiration, Rhythm And Depth] : normal respiratory rhythm and effort [Exaggerated Use Of Accessory Muscles For Inspiration] : no accessory muscle use [Auscultation Breath Sounds / Voice Sounds] : lungs were clear to auscultation bilaterally [Heart Rate And Rhythm] : heart rate was normal and rhythm regular [Heart Sounds] : normal S1 and S2 [Murmurs] : no murmurs [Bowel Sounds] : normal bowel sounds [Abdomen Soft] : soft [Abdomen Tenderness] : non-tender [Involuntary Movements] : no involuntary movements were seen [No Focal Deficits] : no focal deficits [Oriented To Time, Place, And Person] : oriented to person, place, and time [Impaired Insight] : insight and judgment were intact [Affect] : the affect was normal [Mood] : the mood was normal

## 2024-11-19 NOTE — HISTORY OF PRESENT ILLNESS
[Disease: _____________________] : Disease: [unfilled] [M: ___] : M[unfilled] [AJCC Stage: ____] : AJCC Stage: [unfilled] [de-identified] : 83 years old female with PMHX of Hypertension comes for evaluation of newly diagnosed Colon Cancer. As per patient, she was unable to move her bowels for 7 days. She admits to increased fatigue, weakness, abdominal pain, abdominal distention, nausea, vomiting and a weight loss of ~15 pounds. She was taken to [facility], and a CT-Scan of abdomen/pelvis was done that revealed a large bowel obstruction. A sigmoidoscopy was performed which found an obstructing sigmoid tumor, and a stent was placed.  A biopsy was done that was positive for Adenocarcinoma. She denies night sweats, fever, melena or bloody stools.  2024, CT-Scan of Abdomen/Pelvis Large bowel obstruction with secondary small bowel dilatation due to apparent sigmoid carcinoma. Pulmonary masses consistent with metastases. Suggest full chest CT Indeterminant left adrenal nodule. Indeterminant right renal hypodensity.   2024, Flexible Sigmoidoscopy: Malignant completely obstructing tumor in the sigmoid colon s/p biopsy and stent placement (25 x 90 mm). Diverticulosis in the sigmoid colon. Pathology report from that was as follows: Colon, sigmoid mass, biopsy- Adenocarcinoma, superficial fragment. Immunohistochemistry Testing (IHC) for Mismatch Repair Protein: MLH1: Intact nuclear expression. MSH2: Intact nuclear expression. MSH6: Intact nuclear expression. PMS2: Intact nuclear expression. Interpretation: No loss of nuclear expression of MMR proteins (MHL1, MSH2, MSH6, and PMS2). These results show low probability of microsatellite instability-high (MSI-H). PD-L1: Tumor Proportion Score (TPS)=0%.   2024, Fine Needle Aspiration of Lun. LUNG, MAINSTEM, RIGHT, ENDOBRONCHIAL BIOPSY: POSITIVE FOR MALIGNANT CELLS. Adenocarcinoma, CDX2, Ck20 pos, CK7-focal TTF-1 neg consistent with colon primary 2. LUNG, LEFT LOWER LOBE, BRONCHOALVEOLAR LAVAGE: SUSPICIOUS FOR MALIGNANCY. Suspicious for adenocarcinoma  Patient is . She was living alone in [facility] but now, she lives with one of daughter in [place], [place]. She has 3 well adult children. She never smoked. She drinks alcohol socially. 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 The patient has a positive family history of cancer. Her father had Pancreatic Cancer in his 80's. Her paternal grandmother had Breast Cancer in her 90's. Her mother had a unknown type of cancer in her 50's.    [de-identified] : left sided  colon ca, Adenocarcinoma MMR-P, PDL-1- TPS-0 [de-identified] : Lung metastases biopsy pos for metastases, CK20 and CDX-2 pos, TTF-1 neg CK-7 focal consistent with colon primary. [de-identified] : Patient was discharged from hospital 1 week ago. She was admitted for acute renal failure. She was treated with IV hydration. Presently, she feels okay. She also continues on Vectibix. She still has residual neuropathy.

## 2024-11-19 NOTE — PHYSICAL EXAM
[General Appearance - Alert] : alert [General Appearance - In No Acute Distress] : in no acute distress [General Appearance - Well Nourished] : well nourished [General Appearance - Well Developed] : well developed [Extraocular Movements] : extraocular movements were intact [Hearing Threshold Finger Rub Not [place]] : hearing was normal [] : no respiratory distress [Respiration, Rhythm And Depth] : normal respiratory rhythm and effort [Exaggerated Use Of Accessory Muscles For Inspiration] : no accessory muscle use [Auscultation Breath Sounds / Voice Sounds] : lungs were clear to auscultation bilaterally [Heart Rate And Rhythm] : heart rate was normal and rhythm regular [Heart Sounds] : normal S1 and S2 [Murmurs] : no murmurs [Bowel Sounds] : normal bowel sounds [Abdomen Soft] : soft [Abdomen Tenderness] : non-tender [Involuntary Movements] : no involuntary movements were seen [No Focal Deficits] : no focal deficits [Oriented To Time, Place, And Person] : oriented to person, place, and time [Impaired Insight] : insight and judgment were intact [Affect] : the affect was normal [Mood] : the mood was normal

## 2024-11-19 NOTE — ASSESSMENT
[Palliative] : Goals of care discussed with patient: Palliative [Palliative Care Plan] : not applicable at this time [FreeTextEntry1] : The patient was recently discharged from the hospital for acute kidney insufficiency. She was treated with IV hydration. She was advised to continue increased PO fluid. She will continue to receive 1L NS on each disconnection of chemotherapy. The patient was advised to see her PCP/nephrologist for further recommendations. She continues on present chemotherapy of 5-FU leucovorin and Vectibix as maintenance for her metastatic colon carcinoma left-sided with liver metastases without any dose adjustment because these medications do not affect the kidney. The patient will continue to be monitored for side effects and toxicity.  She will undergo follow-up CAT scans to assess for response or progression of disease in 1 month.  She will return to the office in 2 weeks or sooner as needed.

## 2024-11-19 NOTE — DATA REVIEWED
[FreeTextEntry1] : MR ABDOMEN WAW IC  (10/17/2024):   COMPARISON: CT abdomen pelvis 8/20/2024  FINDINGS: LOWER CHEST: Nodular masslike opacities in the right middle and lower lobe better characterized on recent prior CT, compatible with metastatic disease.  LIVER: Normal size and morphology. No steatosis. No suspicious focal liver lesion. Abnormality in the right hepatic lobe seen on CT 8/20/2024 BILE DUCTS: Normal caliber. GALLBLADDER: Cholelithiasis. SPLEEN: Within normal limits. PANCREAS: Within normal limits. ADRENALS: Within normal limits. KIDNEYS/URETERS: No hydronephrosis. There is a 1.3 cm cyst in the right kidney exhibiting mild T1 hyperintensity and no enhancement, likely representing proteinaceous or hemorrhagic component.  VISUALIZED PORTIONS: BOWEL: Partially visualized distal descending colon stent and irregular colon mass. No bowel obstruction. PERITONEUM: No ascites. VESSELS: Within normal limits. RETROPERITONEUM/LYMPH NODES: No lymphadenopathy. ABDOMINAL WALL: Within normal limits. BONES: Within normal limits.  IMPRESSION: No suspicious liver lesions. ----- CT C/A/P  (08/20/2024):   COMPARISON: Chest CT from 5/16/2024. CT scan of abdomen and pelvis from 5/12/2024. CT scans of chest, abdomen and pelvis from 3/11/2024.  FINDINGS: CHEST: LUNGS AND LARGE AIRWAYS: Decrease in size of lung nodules and masses, consistent with improvement in pulmonary metastatic disease. For instance, mass in anterior segment of right upper lobe has decreased from 69 x 47 mm to 35 x 30 mm (4/97). Nodule in right apex has decreased from 19 x 14 mm to 14 x 8 mm (4/65). PLEURA: No pleural effusion. VESSELS: Within normal limits. HEART: Cardiomegaly. No pericardial effusion. MEDIASTINUM AND NHUNG: Improvement in thoracic lymphadenopathy. For instance, station 7 lymph node has decreased from 2.8 cm to 1.5 cm, Station 11RS lymph node has decreased from 2.7 cm to 2.1 cm, and station 5 lymph node has decreased from 2.1 cm to 0.9 cm. The lymph nodes are partially calcified. CHEST WALL AND LOWER NECK: Right chest wall [id number] with tip in the right atrium. Small breast calcifications again present bilaterally.  ABDOMEN AND PELVIS: LIVER: Possible new subtle 0.8 cm low-density lesion segment 8 (2/58). BILE DUCTS: Normal caliber. GALLBLADDER: Within normal limits. SPLEEN: Within normal limits. PANCREAS: Within normal limits. ADRENALS: Within normal limits. KIDNEYS/URETERS: 1.3 cm cyst right kidney. Left kidney within normal limits.  BLADDER: Within normal limits. REPRODUCTIVE ORGANS: Calcified uterine fibroids.  BOWEL: Small hiatal hernia. No bowel obstruction. A stent again traverses the mass of the distal descending colon. The mass has decreased in size from 7.9 x 4.9 cm to 5.8 x 3.9 cm (2/107). The mass is again partially calcified and extends medially into the mesentery. Multiple diverticula in the sigmoid colon and cecum. Normal appendix visualized. PERITONEUM/RETROPERITONEUM: Within normal limits. VESSELS: Atherosclerotic changes. LYMPH NODES: No lymphadenopathy. ABDOMINAL WALL: Within normal limits. BONES: Degenerative changes.  IMPRESSION: 1. Since 5/16/2024, there has been improvement in pulmonary metastatic disease.  2. Improvement in thoracic lymphadenopathy.  3. Decrease in size of colon mass.  4. There is a possible new small liver mass. Recommend MRI of the abdomen with IV contrast material for further evaluation.

## 2024-11-19 NOTE — REVIEW OF SYSTEMS
[Fatigue] : fatigue [Diarrhea: Grade 0] : Diarrhea: Grade 0 [Negative] : Allergic/Immunologic [Dysuria] : no dysuria [Incontinence] : no incontinence [Vaginal Discharge] : no vaginal discharge [Dysmenorrhea/Abn Vaginal Bleeding] : no dysmenorrhea/abnormal vaginal bleeding [FreeTextEntry8] : h/o elevated creatinine.

## 2024-11-19 NOTE — HISTORY OF PRESENT ILLNESS
[FreeTextEntry1] : 84yoF with colon cancer presents for follow up palliative care visit, referred by oncology. PMH significant for HTN.  Onc Hx: Came in for evaluation of newly diagnosed Colon Cancer. As per patient, she was unable to move her bowels for 7 days. She admits to increased fatigue, weakness, abdominal pain, abdominal distention, nausea, vomiting and a weight loss of ~15 pounds. She was taken to [facility], and a CT-Scan of abdomen/pelvis was done that revealed a large bowel obstruction. A sigmoidoscopy was performed which found an obstructing sigmoid tumor, and a stent was placed. A biopsy was done that was positive for Adenocarcinoma. She denies night sweats, fever, melena or bloody stools.  2024, CT-Scan of Abdomen/Pelvis Large bowel obstruction with secondary small bowel dilatation due to apparent sigmoid carcinoma. Pulmonary masses consistent with metastases. Suggest full chest CT Indeterminant left adrenal nodule. Indeterminant right renal hypodensity.  2024, Flexible Sigmoidoscopy: Malignant completely obstructing tumor in the sigmoid colon s/p biopsy and stent placement (25 x 90 mm). Diverticulosis in the sigmoid colon. Pathology report from that was as follows: Colon, sigmoid mass, biopsy- Adenocarcinoma, superficial fragment. Immunohistochemistry Testing (IHC) for Mismatch Repair Protein: MLH1: Intact nuclear expression. MSH2: Intact nuclear expression. MSH6: Intact nuclear expression. PMS2: Intact nuclear expression. Interpretation: No loss of nuclear expression of MMR proteins (MHL1, MSH2, MSH6, and PMS2). These results show low probability of microsatellite instability-high (MSI-H). PD-L1: Tumor Proportion Score (TPS)=0%.  2024, Fine Needle Aspiration of Lun. LUNG, MAINSTEM, RIGHT, ENDOBRONCHIAL BIOPSY: POSITIVE FOR MALIGNANT CELLS. Adenocarcinoma, CDX2, Ck20 pos, CK7-focal TTF-1 neg consistent with colon primary 2. LUNG, LEFT LOWER LOBE, BRONCHOALVEOLAR LAVAGE: SUSPICIOUS FOR MALIGNANCY. Suspicious for adenocarcinoma   Disease: Colon Cancer   Pathology: left sided colon ca, Adenocarcinoma MMR-P, PDL-1- TPS-0   TNM stage: MI AJCC Stage: IV   Lung metastases biopsy pos for metastases, CK20 and CDX-2 pos, TTF-1 neg CK-7 focal consistent with colon primary.   Interval History: Since the last visit the pt remains stable but has decreased appetite and losing weight. She states she has difficulty swallowing. Patient's mouth sores are decreased however. ------------------------------------------------------------------------------------------------------ Pt was referred to supportive oncology for symptom management. She is accompanied by her daughter. At initial evaluation, she denies pain. She is taking Colace and miralax daily. Having BM every 2 days but strains at times. Was using senna tea bags but stopped due to discomfort. Admits to nausea while eating, taking Zofran 8mg q8h. She also admits to weight loss. She is not interested in food. Daughter is scheduling follow up with dentist to correct dentures. Admits to some sores in mouth so prefers eating soft foods. Denies difficulty sleeping.   Social: Patient is . She was living alone in [facility], but now, she lives with one of daughter in [place], [place]. She has 3 well adult children. She never smoked. She drinks alcohol socially. 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 The patient has a positive family history of cancer. Her father had Pancreatic Cancer in his 80's. Her paternal grandmother had Breast Cancer in her 90's. Her mother had a unknown type of cancer in her 50's.   Advance Directives / Decision Makers: Primary HCP is daughter - Марина Ricks [phone], Alternate is son Rob Ricks [phone] (lives in [place]). Plans to bring copy of HCP paperwork next visit.   10/21/24 Interval History: Started cannabis on Friday, 5mg chews 1:1. She felt relaxed, didn't affect appetite much. Using magic mouthwash with relief. She remotes improvement in constipation with adding Senna to her regimen. She has no other concerns today.   24 interval history: Using half of 5mg gummy before meals, but does not notice improvement in appetite. Denies feeling lethargic or any AE. Continues to use magic mouthwash with improvement. Denies constipation, nausea, vomiting. Pt's daughter said she's able to walk a bit on her own but still spends a lot of time laying down. She denies any pain.    ISTOP #: [id number]

## 2024-11-19 NOTE — HISTORY OF PRESENT ILLNESS
[Disease: _____________________] : Disease: [unfilled] [M: ___] : M[unfilled] [AJCC Stage: ____] : AJCC Stage: [unfilled] [de-identified] : 83 years old female with PMHX of Hypertension comes for evaluation of newly diagnosed Colon Cancer. As per patient, she was unable to move her bowels for 7 days. She admits to increased fatigue, weakness, abdominal pain, abdominal distention, nausea, vomiting and a weight loss of ~15 pounds. She was taken to [facility], and a CT-Scan of abdomen/pelvis was done that revealed a large bowel obstruction. A sigmoidoscopy was performed which found an obstructing sigmoid tumor, and a stent was placed.  A biopsy was done that was positive for Adenocarcinoma. She denies night sweats, fever, melena or bloody stools.  2024, CT-Scan of Abdomen/Pelvis Large bowel obstruction with secondary small bowel dilatation due to apparent sigmoid carcinoma. Pulmonary masses consistent with metastases. Suggest full chest CT Indeterminant left adrenal nodule. Indeterminant right renal hypodensity.   2024, Flexible Sigmoidoscopy: Malignant completely obstructing tumor in the sigmoid colon s/p biopsy and stent placement (25 x 90 mm). Diverticulosis in the sigmoid colon. Pathology report from that was as follows: Colon, sigmoid mass, biopsy- Adenocarcinoma, superficial fragment. Immunohistochemistry Testing (IHC) for Mismatch Repair Protein: MLH1: Intact nuclear expression. MSH2: Intact nuclear expression. MSH6: Intact nuclear expression. PMS2: Intact nuclear expression. Interpretation: No loss of nuclear expression of MMR proteins (MHL1, MSH2, MSH6, and PMS2). These results show low probability of microsatellite instability-high (MSI-H). PD-L1: Tumor Proportion Score (TPS)=0%.   2024, Fine Needle Aspiration of Lun. LUNG, MAINSTEM, RIGHT, ENDOBRONCHIAL BIOPSY: POSITIVE FOR MALIGNANT CELLS. Adenocarcinoma, CDX2, Ck20 pos, CK7-focal TTF-1 neg consistent with colon primary 2. LUNG, LEFT LOWER LOBE, BRONCHOALVEOLAR LAVAGE: SUSPICIOUS FOR MALIGNANCY. Suspicious for adenocarcinoma  Patient is . She was living alone in [facility]., but now, she lives with one of daughter in [place], [place]. She has 3 well adult children. She never smoked. She drinks alcohol socially. 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 The patient has a positive family history of cancer. Her father had Pancreatic Cancer in his 80's. Her paternal grandmother had Breast Cancer in her 90's. Her mother had a unknown type of cancer in her 50's.    [de-identified] : left sided  colon ca, Adenocarcinoma MMR-P, PDL-1- TPS-0 [de-identified] : Lung metastases biopsy pos for metastases, CK20 and CDX-2 pos, TTF-1 neg CK-7 focal consistent with colon primary. [de-identified] : Patient was discharged from hospital 1 week ago. She was admitted for acute renal failure. She was treated with IV hydration. Presently, she feels okay. She also continues on Vectibix. She still has residual neuropathy.

## 2024-11-19 NOTE — ASSESSMENT
[FreeTextEntry1] : 84yoF with:  # Colon cancer  - On chemo with FOLFOX and Vectibix, tolerating treatment  # Decrease in appetite  - Relief with magic mouthwash, encouraged her to use more frequently with meals - Taking medical cannabis gummies 2.5mg before meals, but does not notice difference - Take 1 full gummy 5mg to see if improvement - Start Mirtazapine 7.5mg qhs, goal to increase to 15mg qhs (eGFR 10/17/24 is 40)  # Constipation - Improved - Continue Senna at bedtime  # Debility - Does not want PT at this time, will consider  # Advanced care planning - Primary HCP is daughter - Марина Ricks [phone], Alternate is son Rob Ricks [phone] (lives in [place])  # Encounter for Palliative Care - Explained the role of palliative care in enhancing quality of life in the setting of serious illness. Emotional support provided.   Follow up in 2 weeks, call sooner with questions or issues.

## 2024-11-20 NOTE — HISTORY OF PRESENT ILLNESS
[FreeTextEntry1] : 84yoF with colon cancer presents for follow up palliative care visit, referred by oncology. PMH significant for HTN.  Onc Hx: Came in for evaluation of newly diagnosed Colon Cancer. As per patient, she was unable to move her bowels for 7 days. She admits to increased fatigue, weakness, abdominal pain, abdominal distention, nausea, vomiting and a weight loss of ~15 pounds. She was taken to [facility], and a CT-Scan of abdomen/pelvis was done that revealed a large bowel obstruction. A sigmoidoscopy was performed which found an obstructing sigmoid tumor, and a stent was placed. A biopsy was done that was positive for Adenocarcinoma. She denies night sweats, fever, melena or bloody stools.  2024, CT-Scan of Abdomen/Pelvis Large bowel obstruction with secondary small bowel dilatation due to apparent sigmoid carcinoma. Pulmonary masses consistent with metastases. Suggest full chest CT Indeterminant left adrenal nodule. Indeterminant right renal hypodensity.  2024, Flexible Sigmoidoscopy: Malignant completely obstructing tumor in the sigmoid colon s/p biopsy and stent placement (25 x 90 mm). Diverticulosis in the sigmoid colon. Pathology report from that was as follows: Colon, sigmoid mass, biopsy- Adenocarcinoma, superficial fragment. Immunohistochemistry Testing (IHC) for Mismatch Repair Protein: MLH1: Intact nuclear expression. MSH2: Intact nuclear expression. MSH6: Intact nuclear expression. PMS2: Intact nuclear expression. Interpretation: No loss of nuclear expression of MMR proteins (MHL1, MSH2, MSH6, and PMS2). These results show low probability of microsatellite instability-high (MSI-H). PD-L1: Tumor Proportion Score (TPS)=0%.  2024, Fine Needle Aspiration of Lun. LUNG, MAINSTEM, RIGHT, ENDOBRONCHIAL BIOPSY: POSITIVE FOR MALIGNANT CELLS. Adenocarcinoma, CDX2, Ck20 pos, CK7-focal TTF-1 neg consistent with colon primary 2. LUNG, LEFT LOWER LOBE, BRONCHOALVEOLAR LAVAGE: SUSPICIOUS FOR MALIGNANCY. Suspicious for adenocarcinoma   Disease: Colon Cancer   Pathology: left sided colon ca, Adenocarcinoma MMR-P, PDL-1- TPS-0   TNM stage: MI AJCC Stage: IV   Lung metastases biopsy pos for metastases, CK20 and CDX-2 pos, TTF-1 neg CK-7 focal consistent with colon primary.   Interval History: Since the last visit the pt remains stable but has decreased appetite and losing weight. She states she has difficulty swallowing. Patient's mouth sores are decreased however. ------------------------------------------------------------------------------------------------------ Pt was referred to supportive oncology for symptom management. She is accompanied by her daughter. At initial evaluation, she denies pain. She is taking Colace and miralax daily. Having BM every 2 days but strains at times. Was using senna tea bags but stopped due to discomfort. Admits to nausea while eating, taking Zofran 8mg q8h. She also admits to weight loss. She is not interested in food. Daughter is scheduling follow up with dentist to correct dentures. Admits to some sores in mouth so prefers eating soft foods. Denies difficulty sleeping.   Social: Patient is . She was living alone in [facility], but now, she lives with one of daughter in [place], [place]. She has 3 well adult children. She never smoked. She drinks alcohol socially. The patient is a retired [employer] Worker. The patient has a positive family history of cancer. Her father had Pancreatic Cancer in his 80's. Her paternal grandmother had Breast Cancer in her 90's. Her mother had a unknown type of cancer in her 50's.   Advance Directives / Decision Makers: Primary HCP is daughter - Марина Ricks [phone], Alternate is son Rob Ricks [phone] (lives in [place]). Plans to bring copy of HCP paperwork next visit.   10/21/24 Interval History: Started cannabis on Friday, 5mg chews 1:1. She felt relaxed, didn't affect appetite much. Using magic mouthwash with relief. She remotes improvement in constipation with adding Senna to her regimen. She has no other concerns today.   24 interval history: Using half of 5mg gummy before meals, but does not notice improvement in appetite. Denies feeling lethargic or any AE. Continues to use magic mouthwash with improvement. Denies constipation, nausea, vomiting. Pt's daughter said she's able to walk a bit on her own but still spends a lot of time laying down. She denies any pain.   24 interval history: mouth is better, using Magic MW.  eating is poor- no appetite  lost a tooth, top dentures loose  can chew, but diffcult occ N/V- zof is helping bowels- regular - daily- formed to loose  pain- no pain groaning- denies pain- states shes unaware mood- up and down  cannabis- gummies half didn't help appetite, will try 3/4 mirtazapine -d/c for egfr 19,  start olanzapine 2.5mg qhs  ekg: qtc:  ensures, carrot juice, apple juice, water  soups  has food in front of her, no desire to eat   ISTOP #: [id number]

## 2024-11-20 NOTE — ASSESSMENT
[FreeTextEntry1] : 84yoF with:  # Colon cancer  - On chemo with FOLFOX and Vectibix, tolerating treatment  # Decrease in appetite  - Relief with magic mouthwash, encouraged her to use more frequently with meals - Taking medical cannabis gummies 2.5mg before meals, but does not notice difference - Take 1 full gummy 5mg to see if improvement - D/C Mirtazapine 7.5mg qhs,(eGFR 11/18/24 is19) - Start olanzapine 2.5mg qhs   # Constipation - Improved - Continue Senna at bedtime  # Debility - Does not want PT at this time, will consider  # Advanced care planning - Primary HCP is daughter - Марина Ricks [phone], Alternate is son Rob Ricks [phone] (lives in [place])  # Encounter for Palliative Care - Explained the role of palliative care in enhancing quality of life in the setting of serious illness. Emotional support provided.   Follow up in 2 weeks, call sooner with questions or issues.

## 2024-11-20 NOTE — ASSESSMENT
[FreeTextEntry1] : 84yoF with:  # Colon cancer  - On chemo with FOLFOX and Vectibix, tolerating treatment  # Decrease in appetite  - Relief with magic mouthwash, encouraged her to use more frequently with meals - Taking medical cannabis gummies 2.5mg before meals, but does not notice difference - Take 1 full gummy 5mg to see if improvement - D/C Mirtazapine 7.5mg qhs,(eGFR 11/18/24 is19) - Start olanzapine 2.5mg qhs   # Constipation - Improved - Continue Senna at bedtime  # Debility - Does not want PT at this time, will consider  # Advanced care planning - Primary HCP is daughter - Марина Ricks [phone], Alternate is son Rob Ricks [phone] (lives in [place])  # Encounter for Palliative Care - Explained the role of palliative care in enhancing quality of life in the setting of serious illness. Emotional support provided.   Follow up in 2 weeks, call sooner with questions or issues.

## 2024-11-20 NOTE — DATA REVIEWED
[FreeTextEntry1] : MR ABDOMEN WAW IC  (10/17/2024):   COMPARISON: CT abdomen pelvis 8/20/2024  FINDINGS: LOWER CHEST: Nodular masslike opacities in the right middle and lower lobe better characterized on recent prior CT, compatible with metastatic disease.  LIVER: Normal size and morphology. No steatosis. No suspicious focal liver lesion. Abnormality in the right hepatic lobe seen on CT 8/20/2024 BILE DUCTS: Normal caliber. GALLBLADDER: Cholelithiasis. SPLEEN: Within normal limits. PANCREAS: Within normal limits. ADRENALS: Within normal limits. KIDNEYS/URETERS: No hydronephrosis. There is a 1.3 cm cyst in the right kidney exhibiting mild T1 hyperintensity and no enhancement, likely representing proteinaceous or hemorrhagic component.  VISUALIZED PORTIONS: BOWEL: Partially visualized distal descending colon stent and irregular colon mass. No bowel obstruction. PERITONEUM: No ascites. VESSELS: Within normal limits. RETROPERITONEUM/LYMPH NODES: No lymphadenopathy. ABDOMINAL WALL: Within normal limits. BONES: Within normal limits.  IMPRESSION: No suspicious liver lesions. ----- CT C/A/P  (08/20/2024):   COMPARISON: Chest CT from 5/16/2024. CT scan of abdomen and pelvis from 5/12/2024. CT scans of chest, abdomen and pelvis from 3/11/2024.  FINDINGS: CHEST: LUNGS AND LARGE AIRWAYS: Decrease in size of lung nodules and masses, consistent with improvement in pulmonary metastatic disease. For instance, mass in anterior segment of right upper lobe has decreased from 69 x 47 mm to 35 x 30 mm (4/97). Nodule in right apex has decreased from 19 x 14 mm to 14 x 8 mm (4/65). PLEURA: No pleural effusion. VESSELS: Within normal limits. HEART: Cardiomegaly. No pericardial effusion. MEDIASTINUM AND NHUNG: Improvement in thoracic lymphadenopathy. For instance, station 7 lymph node has decreased from 2.8 cm to 1.5 cm, Station 11RS lymph node has decreased from 2.7 cm to 2.1 cm, and station 5 lymph node has decreased from 2.1 cm to 0.9 cm. The lymph nodes are partially calcified. CHEST WALL AND LOWER NECK: Right chest wall [id number] with tip in the right atrium. Small breast calcifications again present bilaterally.  ABDOMEN AND PELVIS: LIVER: Possible new subtle 0.8 cm low-density lesion segment 8 (2/58). BILE DUCTS: Normal caliber. GALLBLADDER: Within normal limits. SPLEEN: Within normal limits. PANCREAS: Within normal limits. ADRENALS: Within normal limits. KIDNEYS/URETERS: 1.3 cm cyst right kidney. Left kidney within normal limits.  BLADDER: Within normal limits. REPRODUCTIVE ORGANS: Calcified uterine fibroids.  BOWEL: Small hiatal hernia. No bowel obstruction. A stent again traverses the mass of the distal descending colon. The mass has decreased in size from 7.9 x 4.9 cm to 5.8 x 3.9 cm (2/107). The mass is again partially calcified and extends medially into the mesentery. Multiple diverticula in the sigmoid colon and cecum. Normal appendix visualized. PERITONEUM/RETROPERITONEUM: Within normal limits. VESSELS: Atherosclerotic changes. LYMPH NODES: No lymphadenopathy. ABDOMINAL WALL: Within normal limits. BONES: Degenerative changes.  IMPRESSION: 1. Since 5/16/2024, there has been improvement in pulmonary metastatic disease.  2. Improvement in thoracic lymphadenopathy.  3. Decrease in size of colon mass.  4. There is a possible new small liver mass. Recommend MRI of the abdomen with IV contrast material for further evaluation.

## 2024-11-20 NOTE — DATA REVIEWED
[FreeTextEntry1] : MR ABDOMEN WAW IC  (10/17/2024):   COMPARISON: CT abdomen pelvis 8/20/2024  FINDINGS: LOWER CHEST: Nodular masslike opacities in the right middle and lower lobe better characterized on recent prior CT, compatible with metastatic disease.  LIVER: Normal size and morphology. No steatosis. No suspicious focal liver lesion. Abnormality in the right hepatic lobe seen on CT 8/20/2024 BILE DUCTS: Normal caliber. GALLBLADDER: Cholelithiasis. SPLEEN: Within normal limits. PANCREAS: Within normal limits. ADRENALS: Within normal limits. KIDNEYS/URETERS: No hydronephrosis. There is a 1.3 cm cyst in the right kidney exhibiting mild T1 hyperintensity and no enhancement, likely representing proteinaceous or hemorrhagic component.  VISUALIZED PORTIONS: BOWEL: Partially visualized distal descending colon stent and irregular colon mass. No bowel obstruction. PERITONEUM: No ascites. VESSELS: Within normal limits. RETROPERITONEUM/LYMPH NODES: No lymphadenopathy. ABDOMINAL WALL: Within normal limits. BONES: Within normal limits.  IMPRESSION: No suspicious liver lesions. ----- CT C/A/P  (08/20/2024):   COMPARISON: Chest CT from 5/16/2024. CT scan of abdomen and pelvis from 5/12/2024. CT scans of chest, abdomen and pelvis from 3/11/2024.  FINDINGS: CHEST: LUNGS AND LARGE AIRWAYS: Decrease in size of lung nodules and masses, consistent with improvement in pulmonary metastatic disease. For instance, mass in anterior segment of right upper lobe has decreased from 69 x 47 mm to 35 x 30 mm (4/97). Nodule in right apex has decreased from 19 x 14 mm to 14 x 8 mm (4/65). PLEURA: No pleural effusion. VESSELS: Within normal limits. HEART: Cardiomegaly. No pericardial effusion. MEDIASTINUM AND NHUNG: Improvement in thoracic lymphadenopathy. For instance, station 7 lymph node has decreased from 2.8 cm to 1.5 cm, Station 11RS lymph node has decreased from 2.7 cm to 2.1 cm, and station 5 lymph node has decreased from 2.1 cm to 0.9 cm. The lymph nodes are partially calcified. CHEST WALL AND LOWER NECK: Right chest wall [id number] with tip in the right atrium. Small breast calcifications again present bilaterally.  ABDOMEN AND PELVIS: LIVER: Possible new subtle 0.8 cm low-density lesion segment 8 (2/58). BILE DUCTS: Normal caliber. GALLBLADDER: Within normal limits. SPLEEN: Within normal limits. PANCREAS: Within normal limits. ADRENALS: Within normal limits. KIDNEYS/URETERS: 1.3 cm cyst right kidney. Left kidney within normal limits.  BLADDER: Within normal limits. REPRODUCTIVE ORGANS: Calcified uterine fibroids.  BOWEL: Small hiatal hernia. No bowel obstruction. A stent again traverses the mass of the distal descending colon. The mass has decreased in size from 7.9 x 4.9 cm to 5.8 x 3.9 cm (2/107). The mass is again partially calcified and extends medially into the mesentery. Multiple diverticula in the sigmoid colon and cecum. Normal appendix visualized. PERITONEUM/RETROPERITONEUM: Within normal limits. VESSELS: Atherosclerotic changes. LYMPH NODES: No lymphadenopathy. ABDOMINAL WALL: Within normal limits. BONES: Degenerative changes.  IMPRESSION: 1. Since 5/16/2024, there has been improvement in pulmonary metastatic disease.  2. Improvement in thoracic lymphadenopathy.  3. Decrease in size of colon mass.  4. There is a possible new small liver mass. Recommend MRI of the abdomen with IV contrast material for further evaluation.

## 2024-11-20 NOTE — PHYSICAL EXAM
[Outer Ear] : the ears and nose were normal in appearance [Edema] : there was no peripheral edema [Cranial Nerves] : cranial nerves 2-12 were intact

## 2024-11-20 NOTE — HISTORY OF PRESENT ILLNESS
[FreeTextEntry1] : 84yoF with colon cancer presents for follow up palliative care visit, referred by oncology. PMH significant for HTN.  Onc Hx: Came in for evaluation of newly diagnosed Colon Cancer. As per patient, she was unable to move her bowels for 7 days. She admits to increased fatigue, weakness, abdominal pain, abdominal distention, nausea, vomiting and a weight loss of ~15 pounds. She was taken to [facility], and a CT-Scan of abdomen/pelvis was done that revealed a large bowel obstruction. A sigmoidoscopy was performed which found an obstructing sigmoid tumor, and a stent was placed. A biopsy was done that was positive for Adenocarcinoma. She denies night sweats, fever, melena or bloody stools.  2024, CT-Scan of Abdomen/Pelvis Large bowel obstruction with secondary small bowel dilatation due to apparent sigmoid carcinoma. Pulmonary masses consistent with metastases. Suggest full chest CT Indeterminant left adrenal nodule. Indeterminant right renal hypodensity.  2024, Flexible Sigmoidoscopy: Malignant completely obstructing tumor in the sigmoid colon s/p biopsy and stent placement (25 x 90 mm). Diverticulosis in the sigmoid colon. Pathology report from that was as follows: Colon, sigmoid mass, biopsy- Adenocarcinoma, superficial fragment. Immunohistochemistry Testing (IHC) for Mismatch Repair Protein: MLH1: Intact nuclear expression. MSH2: Intact nuclear expression. MSH6: Intact nuclear expression. PMS2: Intact nuclear expression. Interpretation: No loss of nuclear expression of MMR proteins (MHL1, MSH2, MSH6, and PMS2). These results show low probability of microsatellite instability-high (MSI-H). PD-L1: Tumor Proportion Score (TPS)=0%.  2024, Fine Needle Aspiration of Lun. LUNG, MAINSTEM, RIGHT, ENDOBRONCHIAL BIOPSY: POSITIVE FOR MALIGNANT CELLS. Adenocarcinoma, CDX2, Ck20 pos, CK7-focal TTF-1 neg consistent with colon primary 2. LUNG, LEFT LOWER LOBE, BRONCHOALVEOLAR LAVAGE: SUSPICIOUS FOR MALIGNANCY. Suspicious for adenocarcinoma   Disease: Colon Cancer   Pathology: left sided colon ca, Adenocarcinoma MMR-P, PDL-1- TPS-0   TNM stage: MI AJCC Stage: IV   Lung metastases biopsy pos for metastases, CK20 and CDX-2 pos, TTF-1 neg CK-7 focal consistent with colon primary.   Interval History: Since the last visit the pt remains stable but has decreased appetite and losing weight. She states she has difficulty swallowing. Patient's mouth sores are decreased however. ------------------------------------------------------------------------------------------------------ Pt was referred to supportive oncology for symptom management. She is accompanied by her daughter. At initial evaluation, she denies pain. She is taking Colace and miralax daily. Having BM every 2 days but strains at times. Was using senna tea bags but stopped due to discomfort. Admits to nausea while eating, taking Zofran 8mg q8h. She also admits to weight loss. She is not interested in food. Daughter is scheduling follow up with dentist to correct dentures. Admits to some sores in mouth so prefers eating soft foods. Denies difficulty sleeping.   Social: Patient is . She was living alone in [facility], but now, she lives with one of daughter in [place], [place]. She has 3 well adult children. She never smoked. She drinks alcohol socially. The patient is a retired [employer] Worker. The patient has a positive family history of cancer. Her father had Pancreatic Cancer in his 80's. Her paternal grandmother had Breast Cancer in her 90's. Her mother had a unknown type of cancer in her 50's.   Advance Directives / Decision Makers: Primary HCP is daughter - Марина Ricks [phone], Alternate is son Rob Ricks [phone] (lives in [place]). Plans to bring copy of HCP paperwork next visit.   10/21/24 Interval History: Started cannabis on Friday, 5mg chews 1:1. She felt relaxed, didn't affect appetite much. Using magic mouthwash with relief. She remotes improvement in constipation with adding Senna to her regimen. She has no other concerns today.   24 interval history: Using half of 5mg gummy before meals, but does not notice improvement in appetite. Denies feeling lethargic or any AE. Continues to use magic mouthwash with improvement. Denies constipation, nausea, vomiting. Pt's daughter said she's able to walk a bit on her own but still spends a lot of time laying down. She denies any pain.   24 interval history: mouth is better, using Magic MW.  eating is poor- no appetite  lost a tooth, top dentures loose  can chew, but diffcult occ N/V- zof is helping bowels- regular - daily- formed to loose  pain- no pain groaning- denies pain- states shes unaware mood- up and down  cannabis- gummies half didn't help appetite, will try 3/4 mirtazapine -d/c for egfr 19,  start olanzapine 2.5mg qhs  ekg: qtc:  ensures, carrot juice, apple juice, water  soups  has food in front of her, no desire to eat   ISTOP #: [id number]

## 2024-12-02 NOTE — ASSESSMENT
[FreeTextEntry1] : The patient will continue to be monitored for side effects and toxicity from her treatment which she tolerates well.  She will also undergo repeat CAT scans to assess for response or progression of disease.  In addition the patient will continue medical and GI follow-up and testing.  Once we have more information further recommendations will be made.  He will return to the office in 2 weeks or sooner as needed. [Palliative] : Goals of care discussed with patient: Palliative [Palliative Care Plan] : not applicable at this time

## 2024-12-02 NOTE — HISTORY OF PRESENT ILLNESS
[Disease: _____________________] : Disease: [unfilled] [M: ___] : M[unfilled] [AJCC Stage: ____] : AJCC Stage: [unfilled] [de-identified] : 83 years old female with PMHX of Hypertension comes for evaluation of newly diagnosed Colon Cancer. As per patient, she was unable to move her bowels for 7 days. She admits to increased fatigue, weakness, abdominal pain, abdominal distention, nausea, vomiting and a weight loss of ~15 pounds. She was taken to [facility], and a CT-Scan of abdomen/pelvis was done that revealed a large bowel obstruction. A sigmoidoscopy was performed which found an obstructing sigmoid tumor, and a stent was placed.  A biopsy was done that was positive for Adenocarcinoma. She denies night sweats, fever, melena or bloody stools.  2024, CT-Scan of Abdomen/Pelvis Large bowel obstruction with secondary small bowel dilatation due to apparent sigmoid carcinoma. Pulmonary masses consistent with metastases. Suggest full chest CT Indeterminant left adrenal nodule. Indeterminant right renal hypodensity.   2024, Flexible Sigmoidoscopy: Malignant completely obstructing tumor in the sigmoid colon s/p biopsy and stent placement (25 x 90 mm). Diverticulosis in the sigmoid colon. Pathology report from that was as follows: Colon, sigmoid mass, biopsy- Adenocarcinoma, superficial fragment. Immunohistochemistry Testing (IHC) for Mismatch Repair Protein: MLH1: Intact nuclear expression. MSH2: Intact nuclear expression. MSH6: Intact nuclear expression. PMS2: Intact nuclear expression. Interpretation: No loss of nuclear expression of MMR proteins (MHL1, MSH2, MSH6, and PMS2). These results show low probability of microsatellite instability-high (MSI-H). PD-L1: Tumor Proportion Score (TPS)=0%.   2024, Fine Needle Aspiration of Lun. LUNG, MAINSTEM, RIGHT, ENDOBRONCHIAL BIOPSY: POSITIVE FOR MALIGNANT CELLS. Adenocarcinoma, CDX2, Ck20 pos, CK7-focal TTF-1 neg consistent with colon primary 2. LUNG, LEFT LOWER LOBE, BRONCHOALVEOLAR LAVAGE: SUSPICIOUS FOR MALIGNANCY. Suspicious for adenocarcinoma  Patient is . She was living alone in [facility]., but now, she lives with one of daughter in [place], [place]. She has 3 well adult children. She never smoked. She drinks alcohol socially. The patient is a retired [employer] Worker. The patient has a positive family history of cancer. Her father had Pancreatic Cancer in his 80's. Her paternal grandmother had Breast Cancer in her 90's. Her mother had a unknown type of cancer in her 50's.    [de-identified] : left sided  colon ca, Adenocarcinoma MMR-P, PDL-1- TPS-0 [de-identified] : Lung metastases biopsy pos for metastases, CK20 and CDX-2 pos, TTF-1 neg CK-7 focal consistent with colon primary. [de-identified] : Since the last visit  the patient continues on maintenance treatment for her metastatic colon carcinoma and receives 5-FU leucovorin and Vectibix which she tolerates well.  She returns to schedule follow-up imaging for evaluation.

## 2024-12-02 NOTE — HISTORY OF PRESENT ILLNESS
[FreeTextEntry1] : 84yoF with colon cancer presents for follow up palliative care visit, referred by oncology. PMH significant for HTN.  Onc Hx: Came in for evaluation of newly diagnosed Colon Cancer. As per patient, she was unable to move her bowels for 7 days. She admits to increased fatigue, weakness, abdominal pain, abdominal distention, nausea, vomiting and a weight loss of ~15 pounds. She was taken to [facility], and a CT-Scan of abdomen/pelvis was done that revealed a large bowel obstruction. A sigmoidoscopy was performed which found an obstructing sigmoid tumor, and a stent was placed. A biopsy was done that was positive for Adenocarcinoma. She denies night sweats, fever, melena or bloody stools.  2024, CT-Scan of Abdomen/Pelvis Large bowel obstruction with secondary small bowel dilatation due to apparent sigmoid carcinoma. Pulmonary masses consistent with metastases. Suggest full chest CT Indeterminant left adrenal nodule. Indeterminant right renal hypodensity.  2024, Flexible Sigmoidoscopy: Malignant completely obstructing tumor in the sigmoid colon s/p biopsy and stent placement (25 x 90 mm). Diverticulosis in the sigmoid colon. Pathology report from that was as follows: Colon, sigmoid mass, biopsy- Adenocarcinoma, superficial fragment. Immunohistochemistry Testing (IHC) for Mismatch Repair Protein: MLH1: Intact nuclear expression. MSH2: Intact nuclear expression. MSH6: Intact nuclear expression. PMS2: Intact nuclear expression. Interpretation: No loss of nuclear expression of MMR proteins (MHL1, MSH2, MSH6, and PMS2). These results show low probability of microsatellite instability-high (MSI-H). PD-L1: Tumor Proportion Score (TPS)=0%.  2024, Fine Needle Aspiration of Lun. LUNG, MAINSTEM, RIGHT, ENDOBRONCHIAL BIOPSY: POSITIVE FOR MALIGNANT CELLS. Adenocarcinoma, CDX2, Ck20 pos, CK7-focal TTF-1 neg consistent with colon primary 2. LUNG, LEFT LOWER LOBE, BRONCHOALVEOLAR LAVAGE: SUSPICIOUS FOR MALIGNANCY. Suspicious for adenocarcinoma   Disease: Colon Cancer   Pathology: left sided colon ca, Adenocarcinoma MMR-P, PDL-1- TPS-0   TNM stage: MI AJCC Stage: IV   Lung metastases biopsy pos for metastases, CK20 and CDX-2 pos, TTF-1 neg CK-7 focal consistent with colon primary.   Interval History: Since the last visit the pt remains stable but has decreased appetite and losing weight. She states she has difficulty swallowing. Patient's mouth sores are decreased however. ------------------------------------------------------------------------------------------------------ Pt was referred to supportive oncology for symptom management. She is accompanied by her daughter. At initial evaluation, she denies pain. She is taking Colace and miralax daily. Having BM every 2 days but strains at times. Was using senna tea bags but stopped due to discomfort. Admits to nausea while eating, taking Zofran 8mg q8h. She also admits to weight loss. She is not interested in food. Daughter is scheduling follow up with dentist to correct dentures. Admits to some sores in mouth so prefers eating soft foods. Denies difficulty sleeping.   Social: Patient is . She was living alone in [facility], but now, she lives with one of daughter in [place], [place]. She has 3 well adult children. She never smoked. She drinks alcohol socially. The patient is a retired [email or web address] Worker. The patient has a positive family history of cancer. Her father had Pancreatic Cancer in his 80's. Her paternal grandmother had Breast Cancer in her 90's. Her mother had a unknown type of cancer in her 50's.   Advance Directives / Decision Makers: Primary HCP is daughter - Марина Ricks [phone], Alternate is son Rob Ricks [phone] (lives in [place]). Plans to bring copy of HCP paperwork next visit.   10/21/24 Interval History: Started cannabis on Friday, 5mg chews 1:1. She felt relaxed, didn't affect appetite much. Using magic mouthwash with relief. She remotes improvement in constipation with adding Senna to her regimen. She has no other concerns today.   24 interval history: Using half of 5mg gummy before meals, but does not notice improvement in appetite. Denies feeling lethargic or any AE. Continues to use magic mouthwash with improvement. Denies constipation, nausea, vomiting. Pt's daughter said she's able to walk a bit on her own but still spends a lot of time laying down. She denies any pain.   24 interval history: Reports mouth feels better, utilizing magic mouthwash PRN. Continues with 5-FU leucovorin and Vectibix , tolerating well with IVF on disconnect. Denies pain. Denies N/V/D, for occasional nausea she utilizes ondansetron with good effect. Endorses ongoing poor oral intake, states no desire for food and drink, she must force herself to eat small amounts, mainly fluids. She reports she lost a tooth which has caused her upper dentures to loosen which makes chewing difficult. She continues on mirtazapine 7.5mg and half cannabis gummies with no effect. Mood is stable, but 'up and down'. Daughter reports change in sacral skin which she has been applying Aquaphor to. Daughter states skin looks better today than in previous weeks.    24 Interval history: Pt started olanzapine 2.5mg at bedtime about 1.5 weeks ago. Patient states she doesn't notice a change in her mood or appetite. Daughter reports she may have had two good days since starting the medication. Patient states she feels more weak and is agreeable to PT referral. Layla any constipation, using stool softener and miralax with relief. She denies any pain. Daughter is using Desitin barrier cream with improvement in skin color change. She continues to use half cannabis gummy.     ISTOP #: [id number]

## 2024-12-02 NOTE — ASSESSMENT
[FreeTextEntry1] : 84yoF with:  # Colon cancer  - On chemo with FOLFOX and Vectibix, tolerating treatment - Plan for scan soon  # Decrease in appetite  - Relief with magic mouthwash, encouraged her to use more frequently with meals - Taking medical cannabis 1/2 gummies 2.5mg before meals - 1 full gummy - c/o intoxication - Continue olanzapine 2.5mg qhs (11/20/24 EKG QT/QTc 350/420ms), consider dose inc at next visit - Plan for daughter to take pt to dentist because she thinks dentures are loose  # Constipation - Improved - Using Miralax and stool softener nightly  # Debility - Pt is physically limited due to malignancy and its sequelae  - Agreeable to home PT  # Impaired Skin Integrity - C/W barrier cream, turn & position q 2, increase nutrition  # Advanced care planning - Primary HCP is daughter - Марина Ricks [phone], Alternate is son Rob Ricks [phone] (lives in [place])  # Encounter for Palliative Care - Explained the role of palliative care in enhancing quality of life in the setting of serious illness. Emotional support provided.   Follow up in 2 weeks, call sooner with questions or issues.

## 2024-12-02 NOTE — DATA REVIEWED
[FreeTextEntry1] : MR ABDOMEN WAW IC  (10/17/2024):   COMPARISON: CT abdomen pelvis 8/20/2024  FINDINGS: LOWER CHEST: Nodular masslike opacities in the right middle and lower lobe better characterized on recent prior CT, compatible with metastatic disease.  LIVER: Normal size and morphology. No steatosis. No suspicious focal liver lesion. Abnormality in the right hepatic lobe seen on CT 8/20/2024 BILE DUCTS: Normal caliber. GALLBLADDER: Cholelithiasis. SPLEEN: Within normal limits. PANCREAS: Within normal limits. ADRENALS: Within normal limits. KIDNEYS/URETERS: No hydronephrosis. There is a 1.3 cm cyst in the right kidney exhibiting mild T1 hyperintensity and no enhancement, likely representing proteinaceous or hemorrhagic component.  VISUALIZED PORTIONS: BOWEL: Partially visualized distal descending colon stent and irregular colon mass. No bowel obstruction. PERITONEUM: No ascites. VESSELS: Within normal limits. RETROPERITONEUM/LYMPH NODES: No lymphadenopathy. ABDOMINAL WALL: Within normal limits. BONES: Within normal limits.  IMPRESSION: No suspicious liver lesions. ----- CT C/A/P  (08/20/2024):   COMPARISON: Chest CT from 5/16/2024. CT scan of abdomen and pelvis from 5/12/2024. CT scans of chest, abdomen and pelvis from 3/11/2024.  FINDINGS: CHEST: LUNGS AND LARGE AIRWAYS: Decrease in size of lung nodules and masses, consistent with improvement in pulmonary metastatic disease. For instance, mass in anterior segment of right upper lobe has decreased from 69 x 47 mm to 35 x 30 mm (4/97). Nodule in right apex has decreased from 19 x 14 mm to 14 x 8 mm (4/65). PLEURA: No pleural effusion. VESSELS: Within normal limits. HEART: Cardiomegaly. No pericardial effusion. MEDIASTINUM AND NHUNG: Improvement in thoracic lymphadenopathy. For instance, station 7 lymph node has decreased from 2.8 cm to 1.5 cm, Station 11RS lymph node has decreased from 2.7 cm to 2.1 cm, and station 5 lymph node has decreased from 2.1 cm to 0.9 cm. The lymph nodes are partially calcified. CHEST WALL AND LOWER NECK: Right chest wall [id number] with tip in the right atrium. Small breast calcifications again present bilaterally.  ABDOMEN AND PELVIS: LIVER: Possible new subtle 0.8 cm low-density lesion segment 8 (2/58). BILE DUCTS: Normal caliber. GALLBLADDER: Within normal limits. SPLEEN: Within normal limits. PANCREAS: Within normal limits. ADRENALS: Within normal limits. KIDNEYS/URETERS: 1.3 cm cyst right kidney. Left kidney within normal limits.  BLADDER: Within normal limits. REPRODUCTIVE ORGANS: Calcified uterine fibroids.  BOWEL: Small hiatal hernia. No bowel obstruction. A stent again traverses the mass of the distal descending colon. The mass has decreased in size from 7.9 x 4.9 cm to 5.8 x 3.9 cm (2/107). The mass is again partially calcified and extends medially into the mesentery. Multiple diverticula in the sigmoid colon and cecum. Normal appendix visualized. PERITONEUM/RETROPERITONEUM: Within normal limits. VESSELS: Atherosclerotic changes. LYMPH NODES: No lymphadenopathy. ABDOMINAL WALL: Within normal limits. BONES: Degenerative changes.  IMPRESSION: 1. Since 5/16/2024, there has been improvement in pulmonary metastatic disease.  2. Improvement in thoracic lymphadenopathy.  3. Decrease in size of colon mass.  4. There is a possible new small liver mass. Recommend MRI of the abdomen with IV contrast material for further evaluation.

## 2024-12-02 NOTE — PHYSICAL EXAM
[General Appearance - Alert] : alert [General Appearance - In No Acute Distress] : in no acute distress [General Appearance - Well Nourished] : well nourished [Extraocular Movements] : extraocular movements were intact [General Appearance - Well Developed] : well developed [Hearing Threshold Finger Rub Not Walsh] : hearing was normal [Outer Ear] : the ears and nose were normal in appearance [] : no respiratory distress [Respiration, Rhythm And Depth] : normal respiratory rhythm and effort [Exaggerated Use Of Accessory Muscles For Inspiration] : no accessory muscle use [Auscultation Breath Sounds / Voice Sounds] : lungs were clear to auscultation bilaterally [Heart Rate And Rhythm] : heart rate was normal and rhythm regular [Heart Sounds] : normal S1 and S2 [Edema] : there was no peripheral edema [Bowel Sounds] : normal bowel sounds [Abdomen Soft] : soft [Abdomen Tenderness] : non-tender [Involuntary Movements] : no involuntary movements were seen [Cranial Nerves] : cranial nerves 2-12 were intact [No Focal Deficits] : no focal deficits [Oriented To Time, Place, And Person] : oriented to person, place, and time [Impaired Insight] : insight and judgment were intact [FreeTextEntry1] : Flat mood/affect

## 2024-12-03 NOTE — ASSESSMENT
[FreeTextEntry1] : The plan of care was discussed with the patient and her daughter in full detail. They verbalized understanding and in agreement with the plan of care.  RTC in 6-8 weeks or early if needed.

## 2024-12-03 NOTE — HISTORY OF PRESENT ILLNESS
[Post-hospitalization from ___ Hospital] : Post-hospitalization from [unfilled] Hospital [Admitted on: ___] : The patient was admitted on [unfilled] [Discharged on ___] : discharged on [unfilled] [FreeTextEntry2] : Hospital Course: Discharge Date 08-Nov-2024 Admission Date 06-Nov-2024 00:50 Reason for Admission CAMILLE on outpatient labs Hospital Course 84 y/o F w/ PMHx of HTN, and newly dx Stage IV L sided Colon adenoca (MMR-P, PDL-1 TPS 0) to lung/?liver, started on FOLFOX/Pantiumumab (last dose 11/4), noted to have elevated Cr to 3.17 w/ increase fatigue and weakness. FeNa was 2.4%, intrinsic, likely 2/2 chemotherapy + dehydration, US kidney/bladder no hydro, grossly nl. CAMILLE improved. Additionally, slight murmur noted on exam, Echo showed nl EF w/ grade I LV diastolic dysfunction, cont to monitor.  ---HEME/ONC--- #Newly dx Stage IV L sided Colon Adenoca (MMR-P, PDL-1 0%) to lung/?liver - Currently on FOLFOX/Pantiumumab, last dose 11/4 (oxaliplatin discontinued) - Overall plan per Dr. Aguilar, next appt 11/18  ---RENAL--- #CAMILLE - No hx of CKD - FeNa 2.4%; Cr peaked at 3.17 - IVF PRN - Renal US bladder normal, no renal mass, hydronephrosis or calculi. R kidney 1.2 cm lower pole cyst. Trace perinephric fluid.  ---CV--- #HTN - Cont Amlodipine -TTE EF 55 to 60%. Mild (grade 1) left ventricular diastolic dysfunction.  #murmur - Echo showed Ejection fraction estimated at 55 to 60%, wnl. Grade I LV diastolic dysfunction.   Patient is an 85 y/o F with PMHx of HTN, prediabetes, HLD, newly diagnosed sigmoid colon (s/p sigmoidoscopy and stent placement) following Heme/onc, who presents today for a post hospital F/u.  Patient was admitted for CAMILLE. s/p IV fluids- cr level improved.  Renal US-w/ normal bladder, no renal mass, hydronephrosis or calculi. R kidney 1.2 cm lower pole cyst. Trace perinephric fluid. Patient was also noted to have cardiac murmur- TTE LVEF 55-60% Grade I diastolic dysfunction. Patient to f/u with cardiology.  Today the patient reports that she still has no appetite but trying to stay hydrated. Patient is following palliative care. taking Cannabis gummies and olanzapine.

## 2024-12-03 NOTE — PHYSICAL EXAM
[Normal] : no acute distress, well nourished, well developed and well-appearing [No Respiratory Distress] : no respiratory distress  [No Accessory Muscle Use] : no accessory muscle use [Clear to Auscultation] : lungs were clear to auscultation bilaterally [Normal Rate] : normal rate  [Regular Rhythm] : with a regular rhythm [Normal S1, S2] : normal S1 and S2 [Pedal Pulses Present] : the pedal pulses are present [No Edema] : there was no peripheral edema [Soft] : abdomen soft [Non Tender] : non-tender [Normal Affect] : the affect was normal [Normal Mood] : the mood was normal

## 2025-01-12 NOTE — HISTORY OF PRESENT ILLNESS
[Disease: _____________________] : Disease: [unfilled] [M: ___] : M[unfilled] [AJCC Stage: ____] : AJCC Stage: [unfilled] [de-identified] : 83 years old female with PMHX of Hypertension comes for evaluation of newly diagnosed Colon Cancer. As per patient, she was unable to move her bowels for 7 days. She admits to increased fatigue, weakness, abdominal pain, abdominal distention, nausea, vomiting and a weight loss of ~15 pounds. She was taken to [facility], and a CT-Scan of abdomen/pelvis was done that revealed a large bowel obstruction. A sigmoidoscopy was performed which found an obstructing sigmoid tumor, and a stent was placed.  A biopsy was done that was positive for Adenocarcinoma. She denies night sweats, fever, melena or bloody stools.  2024, CT-Scan of Abdomen/Pelvis Large bowel obstruction with secondary small bowel dilatation due to apparent sigmoid carcinoma. Pulmonary masses consistent with metastases. Suggest full chest CT Indeterminant left adrenal nodule. Indeterminant right renal hypodensity.   2024, Flexible Sigmoidoscopy: Malignant completely obstructing tumor in the sigmoid colon s/p biopsy and stent placement (25 x 90 mm). Diverticulosis in the sigmoid colon. Pathology report from that was as follows: Colon, sigmoid mass, biopsy- Adenocarcinoma, superficial fragment. Immunohistochemistry Testing (IHC) for Mismatch Repair Protein: MLH1: Intact nuclear expression. MSH2: Intact nuclear expression. MSH6: Intact nuclear expression. PMS2: Intact nuclear expression. Interpretation: No loss of nuclear expression of MMR proteins (MHL1, MSH2, MSH6, and PMS2). These results show low probability of microsatellite instability-high (MSI-H). PD-L1: Tumor Proportion Score (TPS)=0%.   2024, Fine Needle Aspiration of Lun. LUNG, MAINSTEM, RIGHT, ENDOBRONCHIAL BIOPSY: POSITIVE FOR MALIGNANT CELLS. Adenocarcinoma, CDX2, Ck20 pos, CK7-focal TTF-1 neg consistent with colon primary 2. LUNG, LEFT LOWER LOBE, BRONCHOALVEOLAR LAVAGE: SUSPICIOUS FOR MALIGNANCY. Suspicious for adenocarcinoma  Patient is . She was living alone in [facility] but now, she lives with one of daughter in [place], [place]. She has 3 well adult children. She never smoked. She drinks alcohol socially. 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 The patient has a positive family history of cancer. Her father had Pancreatic Cancer in his 80's. Her paternal grandmother had Breast Cancer in her 90's. Her mother had a unknown type of cancer in her 50's.    [de-identified] : left sided  colon ca, Adenocarcinoma MMR-P, PDL-1- TPS-0 [de-identified] : Lung metastases biopsy pos for metastases, CK20 and CDX-2 pos, TTF-1 neg CK-7 focal consistent with colon primary. [de-identified] : Since the last visit the patient has been in the hospital with renal insufficiency and returns for follow-up.

## 2025-01-12 NOTE — REVIEW OF SYSTEMS
[Fatigue] : fatigue [Diarrhea: Grade 0] : Diarrhea: Grade 0 [Joint Pain] : joint pain [Dizziness] : dizziness [Negative] : Allergic/Immunologic [FreeTextEntry8] : The patient doing nephrology follow-up for renal insufficiency.

## 2025-01-12 NOTE — ASSESSMENT
[FreeTextEntry1] : The patient has recently been admitted with increasing renal insufficiency and chronic kidney disease.  She required a renal biopsy which according to the nephrology report suggest tubular injury possibly related to chemotherapy.  As result her current treatment would be held at this point in an attempt to give the patient time to heal the the kidney and improve her renal function.  The plan would be to have the patient revisit in 1 month or sooner as needed with repeat blood to assess for any change in renal status.  He also will be a candidate for further imaging with CAT scans or possibly PET scans since contrast cannot be used.  Once we have more information further recommendations will be made. [Palliative] : Goals of care discussed with patient: Palliative [Palliative Care Plan] : not applicable at this time

## 2025-01-13 NOTE — ASSESSMENT
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  Acute tubular injury can be seen dehydration and drug toxicity.  In addition, there is arteriolar tortuosity and onion skinning; intimal myxoid changes of interlobular arteries and activated endothelial cells, diagnostic for accelerated hypertension.  Clinical correlation is advised. There is no evidence of immune complex mediated glomerulonephritis or monoclonal associated disease in this biopsy.  LIGHT MICROSCOPY The renal biopsy is studied at multiple levels of section and stained with H&E, PAS, Trichrome and Hernández Silver. The biopsy tissue consists of 1 core of complete cortex, containing up to 5 glomeruli, 2 of which are globally sclerotic.  Glomeruli reveal patent capillary lumens with normal mesangial matrix and cellularity.  The glomerular basement membranes appear diffusely and globally corrugated without spikes, holes or splitting on Silver stain.  No crescent formation, fibrin thrombin, segmental sclerosis or endocapillary proliferation noted.  There is estimated at 50% interstitial fibrosis with proportional tubular atrophy and mild focal moderate interstitial lymphocyte infiltrate.   There is mild acute tubular injury of the tubular dilatation and flattening in the tubular epithelium.  Arterioles show tortuosity, onion skinning and occluded lumen without definitive fibrin thrombi.  Arteries reveal severe intimal fibrosis.  IMMUNOFLUORESCENCE MICROSCOPY Frozen tissues consists of up to 8 glomeruli, 3 of which are globally sclerotic. FITC antisera for IgG, IgA, IgM, C3, C1q, kappa, lambda, fibrinogen and albumin are performed. Casts stain for IgA.  Arterioles stain for C3. Remaining stains are essentially negative the glomeruli, tubules, interstitium and vessel wall.  ELECTRON MICROSCOPY Toluidine blue stains show up to 5 glomeruli, 2 of which are globally sclerotic.  2 glomeruli are cut for ultrastructural evaluation.   The glomerular basement membranes are corrugated and are within normal thickness with average thickness of 435 nm (normal 250-450nm).  Laminal remedios internal expansion noted.  No subepithelial, subendothelial or mesangial electron density deposits identified.  There is estimated at 10% visceral epithelial cell foot process effacement.  The endothelial cells are unremarkable without tubular reticular inclusion (TRI). Tubular basement membranes show non-specific ultrastructural changes without deposits.  Verified by: Thiago Copeland MD  metastatic colon Ca  cr 2.42 eGFR 19 stage 4 unless improves further. which improved from 10. no acidosis. will have to discuss with oncology if they can choose a different chemo agent given ATN from chemo. avoid NSAIDs anemia hgb 11. but Tsat 9%. start iron Rx sent  . Ca 9.8, Phos 4.2 monitor   proteinuria mild 0.3gm.   HTN well controlled continue amlodipine.  avoid ACE.-/ARB  reduced Mg to daily and check level next time. last level 1.6  RTC in 2 months

## 2025-01-13 NOTE — HISTORY OF PRESENT ILLNESS
[FreeTextEntry1] : pt was seen by me and renal team at [facility] for recurrent CAMILLE on CKD.  summary of that admission: 84F with a PMH of Metastatic Colon CA (on vectibix,.recent chemo Folfox, oxiplatin,  most recently Panitumumab and 5 FU) with worsening renal function since Oct 2024.  HTN who presented to [facility] due to decreased PO intake and elevated BUN/Cr on o/p labs. Nephrology consulted for CAMILLE.  Per Tamie/RIOS review, SCr has been rising since October with a SCr of 1.32 on 10/7/24. Pt admitted in November for similar presentation and SCr was elevated to 3.17 (11/4/24) and discharged with a SCr of 2.09 (11/8/24). Most recently SCr was elevated to 3.04 on 12/2/24. This admission SCr elevated to 3.6 and remains elevated at 3.5 peaked at 4.4.  pt with decreased PO intake with occasional nausea and vomiting. Pt denied recent fevers, chills, shortness of breath, diarrhea, leg swelling or difficulty with urination. Pt denied foamy urine or blood in the urine. Pt denied NSAID use. no peripheral eosinophilia. No obstructive uropathy, kidney biopsy Acute tubular injury, sec to chemo likely  -Accelerated hypertension,  -Arterionephrosclerosis, severe -today pt with daughter. reports improved weakness and energy. she is planning for 1 week trip with her daughter.  labs reviewd from yesterday cr down to 2.42 from 4.4 . .80. No edema. denies N/v      no fever and no chills. Cardiovascular: the heart rate was not slow, no chest pain and no palpitations. Respiratory: no shortness of breath during exertion. Gastrointestinal: no abdominal pain, no vomiting and no diarrhea. Genitourinary: no dysuria, no urinary hesitancy and no nocturia. Integumentary: no skin lesions. Neurological: no confusion and no dizziness. Endocrine: no episodes of hypoglycemia Constitutional, Eyes, ENT, Cardiovascular, Respiratory, Gastrointestinal, Genitourinary, Neurological and Psychiatric are otherwise negative.

## 2025-01-13 NOTE — HISTORY OF PRESENT ILLNESS
[FreeTextEntry1] : pt was seen by me and renal team at [facility] for recurrent CAMILLE on CKD.  summary of that admission: 84F with a PMH of Metastatic Colon CA (on vectibix,.recent chemo Folfox, oxiplatin,  most recently Panitumumab and 5 FU) with worsening renal function since Oct 2024.  HTN who presented to [facility] due to decreased PO intake and elevated BUN/Cr on o/p labs. Nephrology consulted for CAMILLE.  Per Tamie/RIOS review, SCr has been rising since October with a SCr of 1.32 on 10/7/24. Pt admitted in November for similar presentation and SCr was elevated to 3.17 (11/4/24) and discharged with a SCr of 2.09 (11/8/24). Most recently SCr was elevated to 3.04 on 12/2/24. This admission SCr elevated to 3.6 and remains elevated at 3.5 peaked at 4.4.  pt with decreased PO intake with occasional nausea and vomiting. Pt denied recent fevers, chills, shortness of breath, diarrhea, leg swelling or difficulty with urination. Pt denied foamy urine or blood in the urine. Pt denied NSAID use. no peripheral eosinophilia. No obstructive uropathy, kidney biopsy Acute tubular injury, sec to chemo likely  -Accelerated hypertension,  -Arterionephrosclerosis, severe -today pt with daughter. reports improved weakness and energy. she is planning for 1 week trip with her daughter.  labs reviewd from yesterday cr down to 2.42 from 4.4 . .80. No edema. denies N/v      no fever and no chills. Cardiovascular: the heart rate was not slow, no chest pain and no palpitations. Respiratory: no shortness of breath during exertion. Gastrointestinal: no abdominal pain, no vomiting and no diarrhea. Genitourinary: no dysuria, no urinary hesitancy and no nocturia. Integumentary: no skin lesions. Neurological: no confusion and no dizziness. Endocrine: no episodes of hypoglycemia Constitutional, Eyes, ENT, Cardiovascular, Respiratory, Gastrointestinal, Genitourinary, Neurological and Psychiatric are otherwise negative.

## 2025-01-14 NOTE — ASSESSMENT
[FreeTextEntry1] : The plan of care was discussed with the patient and her daughter in full detail. They verbalized understanding and in agreement with the plan of care.  RTC in 3 months or early if needed.

## 2025-01-14 NOTE — HISTORY OF PRESENT ILLNESS
[Post-hospitalization from ___ Hospital] : Post-hospitalization from [unfilled] Hospital [Admitted on: ___] : The patient was admitted on [unfilled] [Discharged on ___] : discharged on [unfilled] [Patient Contacted By: ____] : and contacted by [unfilled] [FreeTextEntry2] : The patient is an 83 y/o woman with PMHx of stage IV colon CA with mets to the lungs who presents today with her daughter for a post HFU visit.  Patient with recent hospital admission to [facility] for CAMILLE. s/p renal biopsy w/ tubular necrosis. Patient following nephrology. Chemotherapy on hold.  Patient reports that she is feeling better. appetite has been better with dronabinol. requesting refills today. Daughter reports that the patient is travelling to [place] for 10days. will f/u with Heme/onc after she comes back.   Hospital course noted below:     Hospital Course: Discharge Date 27-Dec-2024 Admission Date 14-Dec-2024 04:42 Reason for Admission CAMILLE Hospital Course The patient is an 83yo woman with past medical history of stage IV colon CA with mets to the lungs presenting with 2 weeks of decreased PO intake and elevated BUN/Cr. The patient is currently undergoing chemotherapy treatment with vectibix, leucovorin, and fluorouracil. With routine outpatient lab monitoring during chemo, patient was found to have elevated BUN/Cr and instructed to report to the hospital for further evaluation. Patient had recent admission in early November for similar complaint and received hydration with improvement in creatinine. BMP consistent with outpatient elevated Cr. Patient was admitted for further work-up for CAMILLE.  The patient received gentle fluid repletion in conjunction with prior hospitalization. Patient's creatinine remained lateral to intake creatinine, and urine studies were consistent with intrinsic renal disease. Follow-up renal ultrasound was consistent with medical renal disease, and nephrology was consulted for further evaluation, who recommended renal biopsy to rule out acute interstitial nephritis. Biopsy results showed chronic advanced damage, ischemic acute tubular necrosis, accelerated vascular damage, and onion skinning. Cr stabilized around 3.4 at time of discharge.

## 2025-01-14 NOTE — PHYSICAL EXAM
[Normal] : no acute distress, well nourished, well developed and well-appearing [No Respiratory Distress] : no respiratory distress  [No Accessory Muscle Use] : no accessory muscle use [Clear to Auscultation] : lungs were clear to auscultation bilaterally [Normal Rate] : normal rate  [Regular Rhythm] : with a regular rhythm [Normal S1, S2] : normal S1 and S2 [Normal Affect] : the affect was normal [Normal Mood] : the mood was normal

## 2025-01-27 NOTE — HISTORY OF PRESENT ILLNESS
[Disease: _____________________] : Disease: [unfilled] [M: ___] : M[unfilled] [AJCC Stage: ____] : AJCC Stage: [unfilled] [de-identified] : 83 years old female with PMHX of Hypertension comes for evaluation of newly diagnosed Colon Cancer. As per patient, she was unable to move her bowels for 7 days. She admits to increased fatigue, weakness, abdominal pain, abdominal distention, nausea, vomiting and a weight loss of ~15 pounds. She was taken to [facility], and a CT-Scan of abdomen/pelvis was done that revealed a large bowel obstruction. A sigmoidoscopy was performed which found an obstructing sigmoid tumor, and a stent was placed.  A biopsy was done that was positive for Adenocarcinoma. She denies night sweats, fever, melena or bloody stools.  2024, CT-Scan of Abdomen/Pelvis Large bowel obstruction with secondary small bowel dilatation due to apparent sigmoid carcinoma. Pulmonary masses consistent with metastases. Suggest full chest CT Indeterminant left adrenal nodule. Indeterminant right renal hypodensity.   2024, Flexible Sigmoidoscopy: Malignant completely obstructing tumor in the sigmoid colon s/p biopsy and stent placement (25 x 90 mm). Diverticulosis in the sigmoid colon. Pathology report from that was as follows: Colon, sigmoid mass, biopsy- Adenocarcinoma, superficial fragment. Immunohistochemistry Testing (IHC) for Mismatch Repair Protein: MLH1: Intact nuclear expression. MSH2: Intact nuclear expression. MSH6: Intact nuclear expression. PMS2: Intact nuclear expression. Interpretation: No loss of nuclear expression of MMR proteins (MHL1, MSH2, MSH6, and PMS2). These results show low probability of microsatellite instability-high (MSI-H). PD-L1: Tumor Proportion Score (TPS)=0%.   2024, Fine Needle Aspiration of Lun. LUNG, MAINSTEM, RIGHT, ENDOBRONCHIAL BIOPSY: POSITIVE FOR MALIGNANT CELLS. Adenocarcinoma, CDX2, Ck20 pos, CK7-focal TTF-1 neg consistent with colon primary 2. LUNG, LEFT LOWER LOBE, BRONCHOALVEOLAR LAVAGE: SUSPICIOUS FOR MALIGNANCY. Suspicious for adenocarcinoma  Patient is . She was living alone in [facility] but now, she lives with one of daughter in [place], [place]. She has 3 well adult children. She never smoked. She drinks alcohol socially. The patient is a retired [employer] Worker. The patient has a positive family history of cancer. Her father had Pancreatic Cancer in his 80's. Her paternal grandmother had Breast Cancer in her 90's. Her mother had a unknown type of cancer in her 50's.    [de-identified] : left sided  colon ca, Adenocarcinoma MMR-P, PDL-1- TPS-0 [de-identified] : Lung metastases biopsy pos for metastases, CK20 and CDX-2 pos, TTF-1 neg CK-7 focal consistent with colon primary. [de-identified] : The patient is here for follow up. She was in the hospital for 2 weeks in December for Acute Tubular Necrosis. She was discharged home on Iron pill once a days and Magnesium 400 mg once day. Presently, she feels much better. She admits that her appetite has improved. She c/o rash to genitals area, and skin irritation.

## 2025-01-27 NOTE — HISTORY OF PRESENT ILLNESS
[Disease: _____________________] : Disease: [unfilled] [M: ___] : M[unfilled] [AJCC Stage: ____] : AJCC Stage: [unfilled] [de-identified] : 83 years old female with PMHX of Hypertension comes for evaluation of newly diagnosed Colon Cancer. As per patient, she was unable to move her bowels for 7 days. She admits to increased fatigue, weakness, abdominal pain, abdominal distention, nausea, vomiting and a weight loss of ~15 pounds. She was taken to [facility], and a CT-Scan of abdomen/pelvis was done that revealed a large bowel obstruction. A sigmoidoscopy was performed which found an obstructing sigmoid tumor, and a stent was placed.  A biopsy was done that was positive for Adenocarcinoma. She denies night sweats, fever, melena or bloody stools.  2024, CT-Scan of Abdomen/Pelvis Large bowel obstruction with secondary small bowel dilatation due to apparent sigmoid carcinoma. Pulmonary masses consistent with metastases. Suggest full chest CT Indeterminant left adrenal nodule. Indeterminant right renal hypodensity.   2024, Flexible Sigmoidoscopy: Malignant completely obstructing tumor in the sigmoid colon s/p biopsy and stent placement (25 x 90 mm). Diverticulosis in the sigmoid colon. Pathology report from that was as follows: Colon, sigmoid mass, biopsy- Adenocarcinoma, superficial fragment. Immunohistochemistry Testing (IHC) for Mismatch Repair Protein: MLH1: Intact nuclear expression. MSH2: Intact nuclear expression. MSH6: Intact nuclear expression. PMS2: Intact nuclear expression. Interpretation: No loss of nuclear expression of MMR proteins (MHL1, MSH2, MSH6, and PMS2). These results show low probability of microsatellite instability-high (MSI-H). PD-L1: Tumor Proportion Score (TPS)=0%.   2024, Fine Needle Aspiration of Lun. LUNG, MAINSTEM, RIGHT, ENDOBRONCHIAL BIOPSY: POSITIVE FOR MALIGNANT CELLS. Adenocarcinoma, CDX2, Ck20 pos, CK7-focal TTF-1 neg consistent with colon primary 2. LUNG, LEFT LOWER LOBE, BRONCHOALVEOLAR LAVAGE: SUSPICIOUS FOR MALIGNANCY. Suspicious for adenocarcinoma  Patient is . She was living alone in [facility] but now, she lives with one of daughter in [place], [place]. She has 3 well adult children. She never smoked. She drinks alcohol socially. 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 The patient has a positive family history of cancer. Her father had Pancreatic Cancer in his 80's. Her paternal grandmother had Breast Cancer in her 90's. Her mother had a unknown type of cancer in her 50's.    [de-identified] : left sided  colon ca, Adenocarcinoma MMR-P, PDL-1- TPS-0 [de-identified] : Lung metastases biopsy pos for metastases, CK20 and CDX-2 pos, TTF-1 neg CK-7 focal consistent with colon primary. [de-identified] : The patient is here for follow up. She was in the hospital for 2 weeks in December for Acute Tubular Necrosis. She was discharged home on Iron pill once a days and Magnesium 400 mg once day. Presently, she feels much better. She admits that her appetite has improved. She c/o rash to genitals area, and skin irritation.

## 2025-01-27 NOTE — ASSESSMENT
[Palliative] : Goals of care discussed with patient: Palliative [Palliative Care Plan] : not applicable at this time [FreeTextEntry1] : The patient has was hospitalized for Acute Tubar Necrosis. Her chemotherapy treatments have been held since December 2024.We will continue to monitor her kidney function.  I ordered a repeat PET-Scan to assess for progression vs regression of disease, f/u.   The plan would be to have the patient follow up in 3 weeks to discuss the next steps in her care.  She will not be a candidate for further imaging with CAT scans since contrast cannot be used.  Today, I ordered repeat blood tests with tumor markers, f/u.  50 mg x 1 given and Nystatin cream for genital rash. Rx for Diflucan 1The patient will continue to follow up with Medical MD, and Nephrologist, as directed.   The patient will RTO in 3 weeks   Dr. Nacho Wallis agrees with the above plan.

## 2025-02-10 NOTE — PHYSICAL EXAM
[Normal] : no acute distress, well nourished, well developed and well-appearing [No Respiratory Distress] : no respiratory distress  [No Accessory Muscle Use] : no accessory muscle use [Clear to Auscultation] : lungs were clear to auscultation bilaterally [Normal Rate] : normal rate  [Regular Rhythm] : with a regular rhythm [Normal S1, S2] : normal S1 and S2 [de-identified] : perineal area- red rash consistent with MAD, dry flaky skin b/l legs, dry patch on left upper arm and left lower leg rash c/w Atopic dermatitis

## 2025-02-10 NOTE — PHYSICAL EXAM
[Normal] : no acute distress, well nourished, well developed and well-appearing [No Respiratory Distress] : no respiratory distress  [No Accessory Muscle Use] : no accessory muscle use [Clear to Auscultation] : lungs were clear to auscultation bilaterally [Normal Rate] : normal rate  [Regular Rhythm] : with a regular rhythm [Normal S1, S2] : normal S1 and S2 [de-identified] : perineal area- red rash consistent with MAD, dry flaky skin b/l legs, dry patch on left upper arm and left lower leg rash c/w Atopic dermatitis

## 2025-02-10 NOTE — HISTORY OF PRESENT ILLNESS
[FreeTextEntry8] : The patient is an 83 y/o woman with PMHx of stage IV colon CA with mets to the lungs who presents today with her daughter for perineal rash that has been going on for the past few weeks.  Patient is also c/o dry skin overall and dry patches on left upper arm and left lower leg.  Daughter reports that she has used nystatin creme with good results but with the creme the perineal area stays moist all the time. Patient is continent of bowel and bladder and denies using diapers, she denies using any new skin care products.

## 2025-02-10 NOTE — ASSESSMENT
[FreeTextEntry1] : The plan of care was discussed with the patient and her daughter in full detail. They verbalized understanding and in agreement with the plan of care.

## 2025-02-10 NOTE — HISTORY OF PRESENT ILLNESS
[FreeTextEntry8] : The patient is an 85 y/o woman with PMHx of stage IV colon CA with mets to the lungs who presents today with her daughter for perineal rash that has been going on for the past few weeks.  Patient is also c/o dry skin overall and dry patches on left upper arm and left lower leg.  Daughter reports that she has used nystatin creme with good results but with the creme the perineal area stays moist all the time. Patient is continent of bowel and bladder and denies using diapers, she denies using any new skin care products.

## 2025-02-10 NOTE — PHYSICAL EXAM
[Normal] : no acute distress, well nourished, well developed and well-appearing [No Respiratory Distress] : no respiratory distress  [No Accessory Muscle Use] : no accessory muscle use [Clear to Auscultation] : lungs were clear to auscultation bilaterally [Normal Rate] : normal rate  [Regular Rhythm] : with a regular rhythm [Normal S1, S2] : normal S1 and S2 [de-identified] : perineal area- red rash consistent with MAD, dry flaky skin b/l legs, dry patch on left upper arm and left lower leg rash c/w Atopic dermatitis

## 2025-02-26 NOTE — ASSESSMENT
[FreeTextEntry1] : The patient returns from vacationing family in in [place] and continues to do well.  She did have a repeat PET scan which showed decreased lung nodules and thickening of the bowel in the area of the stent suggesting possible residual disease.  The patient will continue with GI follow-up.  Patient also has had chronic renal insufficiency and undergoes nephrology follow-up.  Last creatinine was 2.5 and remained stable.  She currently is off her chemotherapy and Vectibix.  Since the scan is stable we will just continue to watch her and monitor her for recurrent disease.  The patient will return in 1 month undergo follow-up evaluation regarding further treatment. [Palliative] : Goals of care discussed with patient: Palliative [Palliative Care Plan] : not applicable at this time

## 2025-02-26 NOTE — HISTORY OF PRESENT ILLNESS
[Disease: _____________________] : Disease: [unfilled] [M: ___] : M[unfilled] [AJCC Stage: ____] : AJCC Stage: [unfilled] [de-identified] : 83 years old female with PMHX of Hypertension comes for evaluation of newly diagnosed Colon Cancer. As per patient, she was unable to move her bowels for 7 days. She admits to increased fatigue, weakness, abdominal pain, abdominal distention, nausea, vomiting and a weight loss of ~15 pounds. She was taken to [facility], and a CT-Scan of abdomen/pelvis was done that revealed a large bowel obstruction. A sigmoidoscopy was performed which found an obstructing sigmoid tumor, and a stent was placed.  A biopsy was done that was positive for Adenocarcinoma. She denies night sweats, fever, melena or bloody stools.  2024, CT-Scan of Abdomen/Pelvis Large bowel obstruction with secondary small bowel dilatation due to apparent sigmoid carcinoma. Pulmonary masses consistent with metastases. Suggest full chest CT Indeterminant left adrenal nodule. Indeterminant right renal hypodensity.   2024, Flexible Sigmoidoscopy: Malignant completely obstructing tumor in the sigmoid colon s/p biopsy and stent placement (25 x 90 mm). Diverticulosis in the sigmoid colon. Pathology report from that was as follows: Colon, sigmoid mass, biopsy- Adenocarcinoma, superficial fragment. Immunohistochemistry Testing (IHC) for Mismatch Repair Protein: MLH1: Intact nuclear expression. MSH2: Intact nuclear expression. MSH6: Intact nuclear expression. PMS2: Intact nuclear expression. Interpretation: No loss of nuclear expression of MMR proteins (MHL1, MSH2, MSH6, and PMS2). These results show low probability of microsatellite instability-high (MSI-H). PD-L1: Tumor Proportion Score (TPS)=0%.   2024, Fine Needle Aspiration of Lun. LUNG, MAINSTEM, RIGHT, ENDOBRONCHIAL BIOPSY: POSITIVE FOR MALIGNANT CELLS. Adenocarcinoma, CDX2, Ck20 pos, CK7-focal TTF-1 neg consistent with colon primary 2. LUNG, LEFT LOWER LOBE, BRONCHOALVEOLAR LAVAGE: SUSPICIOUS FOR MALIGNANCY. Suspicious for adenocarcinoma  Patient is . She was living alone in [facility] but now, she lives with one of daughter in [place], [place]. She has 3 well adult children. She never smoked. She drinks alcohol socially. The patient is a retired [employer] Work[employer]. The patient has a positive family history of cancer. Her father had Pancreatic Cancer in his 80's. Her paternal grandmother had Breast Cancer in her 90's. Her mother had a unknown type of cancer in her 50's.    [de-identified] : left sided  colon ca, Adenocarcinoma MMR-P, PDL-1- TPS-0 [de-identified] : Lung metastases biopsy pos for metastases, CK20 and CDX-2 pos, TTF-1 neg CK-7 focal consistent with colon primary.

## 2025-03-26 NOTE — HISTORY OF PRESENT ILLNESS
[Disease: _____________________] : Disease: [unfilled] [M: ___] : M[unfilled] [AJCC Stage: ____] : AJCC Stage: [unfilled] [de-identified] : 83 years old female with PMHX of Hypertension comes for evaluation of newly diagnosed Colon Cancer. As per patient, she was unable to move her bowels for 7 days. She admits to increased fatigue, weakness, abdominal pain, abdominal distention, nausea, vomiting and a weight loss of ~15 pounds. She was taken to [facility], and a CT-Scan of abdomen/pelvis was done that revealed a large bowel obstruction. A sigmoidoscopy was performed which found an obstructing sigmoid tumor, and a stent was placed.  A biopsy was done that was positive for Adenocarcinoma. She denies night sweats, fever, melena or bloody stools.  2024, CT-Scan of Abdomen/Pelvis Large bowel obstruction with secondary small bowel dilatation due to apparent sigmoid carcinoma. Pulmonary masses consistent with metastases. Suggest full chest CT Indeterminant left adrenal nodule. Indeterminant right renal hypodensity.   2024, Flexible Sigmoidoscopy: Malignant completely obstructing tumor in the sigmoid colon s/p biopsy and stent placement (25 x 90 mm). Diverticulosis in the sigmoid colon. Pathology report from that was as follows: Colon, sigmoid mass, biopsy- Adenocarcinoma, superficial fragment. Immunohistochemistry Testing (IHC) for Mismatch Repair Protein: MLH1: Intact nuclear expression. MSH2: Intact nuclear expression. MSH6: Intact nuclear expression. PMS2: Intact nuclear expression. Interpretation: No loss of nuclear expression of MMR proteins (MHL1, MSH2, MSH6, and PMS2). These results show low probability of microsatellite instability-high (MSI-H). PD-L1: Tumor Proportion Score (TPS)=0%.   2024, Fine Needle Aspiration of Lun. LUNG, MAINSTEM, RIGHT, ENDOBRONCHIAL BIOPSY: POSITIVE FOR MALIGNANT CELLS. Adenocarcinoma, CDX2, Ck20 pos, CK7-focal TTF-1 neg consistent with colon primary 2. LUNG, LEFT LOWER LOBE, BRONCHOALVEOLAR LAVAGE: SUSPICIOUS FOR MALIGNANCY. Suspicious for adenocarcinoma  Patient is . She was living alone in [facility] but now, she lives with one of daughter in [place], [place]. She has 3 well adult children. She never smoked. She drinks alcohol socially. 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 The patient has a positive family history of cancer. Her father had Pancreatic Cancer in his 80's. Her paternal grandmother had Breast Cancer in her 90's. Her mother had a unknown type of cancer in her 50's.    [de-identified] : left sided  colon ca, Adenocarcinoma MMR-P, PDL-1- TPS-0 [de-identified] : Lung metastases biopsy pos for metastases, CK20 and CDX-2 pos, TTF-1 neg CK-7 focal consistent with colon primary. [de-identified] : Since the last visit the patient remains stable and has no new symptoms indicating progression of disease.

## 2025-03-26 NOTE — ASSESSMENT
[FreeTextEntry1] : Has history of colon carcinoma with lung metastases status post chemotherapy with FOLFOX and Vectibix.  The patient has no new signs or symptoms indicating progression of disease.  She will continue follow-up off chemotherapy and repeat blood testing to evaluate kidney function and disease progression.  She will undergo repeat CAT scans to assess for response or progression.  She will return to the office in 1 month or sooner as needed. [Palliative] : Goals of care discussed with patient: Palliative [Palliative Care Plan] : not applicable at this time

## 2025-04-28 NOTE — ASSESSMENT
This is a patient who saw Dr Mendez at the [address].  Will change her to the RCP  He is asking for refills   [Palliative] : Goals of care discussed with patient: Palliative [Palliative Care Plan] : not applicable at this time [FreeTextEntry1] : Has history of colon carcinoma with lung metastases status post chemotherapy with FOLFOX and Vecitbix. The treatment has been on hold since January of 2025 due decreased kidney function and poor PO intake. Presently, she feels stronger, and she is eating better. I ordered a repeat CT-Scan of Abdomen/Pelvis/Chest to assess for progression vs regression of disease, f/u. She will continue follow-up off chemotherapy and repeat blood testing.  The patient will continue to follow up with Medical, Nephrologist, MDs, as directed. She will return to the office in 2 month or sooner as needed.  Patient being seen as per physician's primary plan of care.

## 2025-04-28 NOTE — ASSESSMENT
[Palliative] : Goals of care discussed with patient: Palliative [Palliative Care Plan] : not applicable at this time [FreeTextEntry1] : Has history of colon carcinoma with lung metastases status post chemotherapy with FOLFOX and Vecitbix. The treatment has been on hold since January of 2025 due decreased kidney function and poor PO intake. Presently, she feels stronger, and she is eating better. I ordered a repeat CT-Scan of Abdomen/Pelvis/Chest to assess for progression vs regression of disease, f/u. She will continue follow-up off chemotherapy and repeat blood testing.  The patient will continue to follow up with Medical, Nephrologist, MDs, as directed. She will return to the office in 2 month or sooner as needed.  Patient being seen as per physician's primary plan of care.

## 2025-04-28 NOTE — HISTORY OF PRESENT ILLNESS
[Disease: _____________________] : Disease: [unfilled] [M: ___] : M[unfilled] [AJCC Stage: ____] : AJCC Stage: [unfilled] [de-identified] : 83 years old female with PMHX of Hypertension comes for evaluation of newly diagnosed Colon Cancer. As per patient, she was unable to move her bowels for 7 days. She admits to increased fatigue, weakness, abdominal pain, abdominal distention, nausea, vomiting and a weight loss of ~15 pounds. She was taken to [facility], and a CT-Scan of abdomen/pelvis was done that revealed a large bowel obstruction. A sigmoidoscopy was performed which found an obstructing sigmoid tumor, and a stent was placed.  A biopsy was done that was positive for Adenocarcinoma. She denies night sweats, fever, melena or bloody stools.  2024, CT-Scan of Abdomen/Pelvis Large bowel obstruction with secondary small bowel dilatation due to apparent sigmoid carcinoma. Pulmonary masses consistent with metastases. Suggest full chest CT Indeterminant left adrenal nodule. Indeterminant right renal hypodensity.   2024, Flexible Sigmoidoscopy: Malignant completely obstructing tumor in the sigmoid colon s/p biopsy and stent placement (25 x 90 mm). Diverticulosis in the sigmoid colon. Pathology report from that was as follows: Colon, sigmoid mass, biopsy- Adenocarcinoma, superficial fragment. Immunohistochemistry Testing (IHC) for Mismatch Repair Protein: MLH1: Intact nuclear expression. MSH2: Intact nuclear expression. MSH6: Intact nuclear expression. PMS2: Intact nuclear expression. Interpretation: No loss of nuclear expression of MMR proteins (MHL1, MSH2, MSH6, and PMS2). These results show low probability of microsatellite instability-high (MSI-H). PD-L1: Tumor Proportion Score (TPS)=0%.   2024, Fine Needle Aspiration of Lun. LUNG, MAINSTEM, RIGHT, ENDOBRONCHIAL BIOPSY: POSITIVE FOR MALIGNANT CELLS. Adenocarcinoma, CDX2, Ck20 pos, CK7-focal TTF-1 neg consistent with colon primary 2. LUNG, LEFT LOWER LOBE, BRONCHOALVEOLAR LAVAGE: SUSPICIOUS FOR MALIGNANCY. Suspicious for adenocarcinoma  Patient is . She was living alone in [facility]., but now, she lives with one of daughter in [place], [place]. She has 3 well adult children. She never smoked. She drinks alcohol socially. The patient is a retired [employer]. The patient has a positive family history of cancer. Her father had Pancreatic Cancer in his 80's. Her paternal grandmother had Breast Cancer in her 90's. Her mother had a unknown type of cancer in her 50's.    [de-identified] : left sided  colon ca, Adenocarcinoma MMR-P, PDL-1- TPS-0 [de-identified] : Lung metastases biopsy pos for metastases, CK20 and CDX-2 pos, TTF-1 neg CK-7 focal consistent with colon primary. [de-identified] : Patient is here for f/u. The patient feels okay, and she is eating better, since she has been off of treatment. The patient had been more active and going out with family more.

## 2025-04-28 NOTE — HISTORY OF PRESENT ILLNESS
[Disease: _____________________] : Disease: [unfilled] [M: ___] : M[unfilled] [AJCC Stage: ____] : AJCC Stage: [unfilled] [de-identified] : 83 years old female with PMHX of Hypertension comes for evaluation of newly diagnosed Colon Cancer. As per patient, she was unable to move her bowels for 7 days. She admits to increased fatigue, weakness, abdominal pain, abdominal distention, nausea, vomiting and a weight loss of ~15 pounds. She was taken to [facility], and a CT-Scan of abdomen/pelvis was done that revealed a large bowel obstruction. A sigmoidoscopy was performed which found an obstructing sigmoid tumor, and a stent was placed.  A biopsy was done that was positive for Adenocarcinoma. She denies night sweats, fever, melena or bloody stools.  2024, CT-Scan of Abdomen/Pelvis Large bowel obstruction with secondary small bowel dilatation due to apparent sigmoid carcinoma. Pulmonary masses consistent with metastases. Suggest full chest CT Indeterminant left adrenal nodule. Indeterminant right renal hypodensity.   2024, Flexible Sigmoidoscopy: Malignant completely obstructing tumor in the sigmoid colon s/p biopsy and stent placement (25 x 90 mm). Diverticulosis in the sigmoid colon. Pathology report from that was as follows: Colon, sigmoid mass, biopsy- Adenocarcinoma, superficial fragment. Immunohistochemistry Testing (IHC) for Mismatch Repair Protein: MLH1: Intact nuclear expression. MSH2: Intact nuclear expression. MSH6: Intact nuclear expression. PMS2: Intact nuclear expression. Interpretation: No loss of nuclear expression of MMR proteins (MHL1, MSH2, MSH6, and PMS2). These results show low probability of microsatellite instability-high (MSI-H). PD-L1: Tumor Proportion Score (TPS)=0%.   2024, Fine Needle Aspiration of Lun. LUNG, MAINSTEM, RIGHT, ENDOBRONCHIAL BIOPSY: POSITIVE FOR MALIGNANT CELLS. Adenocarcinoma, CDX2, Ck20 pos, CK7-focal TTF-1 neg consistent with colon primary 2. LUNG, LEFT LOWER LOBE, BRONCHOALVEOLAR LAVAGE: SUSPICIOUS FOR MALIGNANCY. Suspicious for adenocarcinoma  Patient is . She was living alone in [facility] but now, she lives with one of daughter in [place], [place]. She has 3 well adult children. She never smoked. She drinks alcohol socially. The patient is a retired [employer]. The patient has a positive family history of cancer. Her father had Pancreatic Cancer in his 80's. Her paternal grandmother had Breast Cancer in her 90's. Her mother had a unknown type of cancer in her 50's.    [de-identified] : left sided  colon ca, Adenocarcinoma MMR-P, PDL-1- TPS-0 [de-identified] : Lung metastases biopsy pos for metastases, CK20 and CDX-2 pos, TTF-1 neg CK-7 focal consistent with colon primary. [de-identified] : Patient is here for f/u. The patient feels okay, and she is eating better, since she has been off of treatment. The patient had been more active and going out with family more.

## 2025-06-25 NOTE — HISTORY OF PRESENT ILLNESS
[Disease: _____________________] : Disease: [unfilled] [M: ___] : M[unfilled] [AJCC Stage: ____] : AJCC Stage: [unfilled] [de-identified] : 83 years old female with PMHX of Hypertension comes for evaluation of newly diagnosed Colon Cancer. As per patient, she was unable to move her bowels for 7 days. She admits to increased fatigue, weakness, abdominal pain, abdominal distention, nausea, vomiting and a weight loss of ~15 pounds. She was taken to [facility], and a CT-Scan of abdomen/pelvis was done that revealed a large bowel obstruction. A sigmoidoscopy was performed which found an obstructing sigmoid tumor, and a stent was placed.  A biopsy was done that was positive for Adenocarcinoma. She denies night sweats, fever, melena or bloody stools.  2024, CT-Scan of Abdomen/Pelvis Large bowel obstruction with secondary small bowel dilatation due to apparent sigmoid carcinoma. Pulmonary masses consistent with metastases. Suggest full chest CT Indeterminant left adrenal nodule. Indeterminant right renal hypodensity.   2024, Flexible Sigmoidoscopy: Malignant completely obstructing tumor in the sigmoid colon s/p biopsy and stent placement (25 x 90 mm). Diverticulosis in the sigmoid colon. Pathology report from that was as follows: Colon, sigmoid mass, biopsy- Adenocarcinoma, superficial fragment. Immunohistochemistry Testing (IHC) for Mismatch Repair Protein: MLH1: Intact nuclear expression. MSH2: Intact nuclear expression. MSH6: Intact nuclear expression. PMS2: Intact nuclear expression. Interpretation: No loss of nuclear expression of MMR proteins (MHL1, MSH2, MSH6, and PMS2). These results show low probability of microsatellite instability-high (MSI-H). PD-L1: Tumor Proportion Score (TPS)=0%.   2024, Fine Needle Aspiration of Lun. LUNG, MAINSTEM, RIGHT, ENDOBRONCHIAL BIOPSY: POSITIVE FOR MALIGNANT CELLS. Adenocarcinoma, CDX2, Ck20 pos, CK7-focal TTF-1 neg consistent with colon primary 2. LUNG, LEFT LOWER LOBE, BRONCHOALVEOLAR LAVAGE: SUSPICIOUS FOR MALIGNANCY. Suspicious for adenocarcinoma  Patient is . She was living alone in [facility]., but now, she lives with one of daughter in [place], [place]. She has 3 well adult children. She never smoked. She drinks alcohol socially. The patient is a retired [employer]. The patient has a positive family history of cancer. Her father had Pancreatic Cancer in his 80's. Her paternal grandmother had Breast Cancer in her 90's. Her mother had a unknown type of cancer in her 50's.    [de-identified] : left sided  colon ca, Adenocarcinoma MMR-P, PDL-1- TPS-0 [de-identified] : Lung metastases biopsy pos for metastases, CK20 and CDX-2 pos, TTF-1 neg CK-7 focal consistent with colon primary.

## 2025-06-25 NOTE — ASSESSMENT
[FreeTextEntry1] : The patient overall is stable with no new symptoms and did undergo follow-up CAT scan which now shows increasing lung nodules which remains small.  As result we will be discussing with radiation oncology the role of SBRT to these lesions in an attempt to avoid restarting chemotherapy.  Patient otherwise feels well and has no signs or symptoms indicating progression.  She will return after evaluation regarding SBRT to the lung lesions. [Palliative] : Goals of care discussed with patient: Palliative [Palliative Care Plan] : not applicable at this time